# Patient Record
Sex: MALE | Race: WHITE | NOT HISPANIC OR LATINO | Employment: FULL TIME | URBAN - METROPOLITAN AREA
[De-identification: names, ages, dates, MRNs, and addresses within clinical notes are randomized per-mention and may not be internally consistent; named-entity substitution may affect disease eponyms.]

---

## 2017-01-17 ENCOUNTER — TELEPHONE (OUTPATIENT)
Dept: CARDIOLOGY | Facility: HOSPITAL | Age: 62
End: 2017-01-17

## 2017-01-17 NOTE — TELEPHONE ENCOUNTER
Dr. Mosley,   I had an cardiac rehab interview with Mr. Pfeiffer today and a couple of questions came up. One he is still on Amiodarone 100mg 2tabs BID. He was wondering when he would start weaning off this medication. He has had no further episodes of A-fib. He is also on aspirin 325mg daily . He was wondering if he needed to decrease to asa 81mg?    Lastly, he was wanting to start lower body light weight training. Are you ok with that?  Thanks,   Magalie

## 2017-01-17 NOTE — TELEPHONE ENCOUNTER
Decrease to aspirin 81 mg a day.  Decrease amiodarone down to 200 mg daily for 2 weeks then discontinue altogether.

## 2017-01-17 NOTE — TELEPHONE ENCOUNTER
Dimitri,   I called Mr. Pfeiffer to let him know to decrease his ASA 81mg daily and to decrease amiodarone 200mg daily for 2 weeks and then discontinue.  Magalie

## 2017-01-19 ENCOUNTER — TELEPHONE (OUTPATIENT)
Dept: CARDIOLOGY | Facility: HOSPITAL | Age: 62
End: 2017-01-19

## 2017-01-19 ENCOUNTER — APPOINTMENT (OUTPATIENT)
Dept: CARDIOLOGY | Facility: HOSPITAL | Age: 62
End: 2017-01-19

## 2017-01-19 ENCOUNTER — HOSPITAL ENCOUNTER (OUTPATIENT)
Dept: CARDIOLOGY | Facility: HOSPITAL | Age: 62
Setting detail: THERAPIES SERIES
Discharge: HOME | End: 2017-01-19

## 2017-01-19 ENCOUNTER — HOSPITAL ENCOUNTER (OUTPATIENT)
Dept: CARDIOLOGY | Facility: HOSPITAL | Age: 62
End: 2017-01-19

## 2017-01-19 PROCEDURE — 93798 PHYS/QHP OP CAR RHAB W/ECG: CPT | Performed by: INTERNAL MEDICINE

## 2017-01-19 NOTE — TELEPHONE ENCOUNTER
Dr. Mosley,  Can he start light lower body weight training on his off days of rehab.   Thanks,Magalie

## 2017-01-20 ENCOUNTER — HOSPITAL ENCOUNTER (OUTPATIENT)
Dept: CARDIOLOGY | Facility: HOSPITAL | Age: 62
Setting detail: THERAPIES SERIES
Discharge: HOME | End: 2017-01-20

## 2017-01-20 PROCEDURE — 93798 PHYS/QHP OP CAR RHAB W/ECG: CPT | Performed by: INTERNAL MEDICINE

## 2017-01-23 ENCOUNTER — HOSPITAL ENCOUNTER (OUTPATIENT)
Dept: CARDIOLOGY | Facility: HOSPITAL | Age: 62
Setting detail: THERAPIES SERIES
Discharge: HOME | End: 2017-01-23

## 2017-01-23 PROCEDURE — 93798 PHYS/QHP OP CAR RHAB W/ECG: CPT | Performed by: INTERNAL MEDICINE

## 2017-01-25 ENCOUNTER — HOSPITAL ENCOUNTER (OUTPATIENT)
Dept: CARDIOLOGY | Facility: HOSPITAL | Age: 62
Setting detail: THERAPIES SERIES
Discharge: HOME | End: 2017-01-25

## 2017-01-25 PROCEDURE — 93798 PHYS/QHP OP CAR RHAB W/ECG: CPT | Performed by: INTERNAL MEDICINE

## 2017-01-27 ENCOUNTER — HOSPITAL ENCOUNTER (OUTPATIENT)
Dept: CARDIOLOGY | Facility: HOSPITAL | Age: 62
Setting detail: THERAPIES SERIES
Discharge: HOME | End: 2017-01-27

## 2017-01-27 PROCEDURE — 93798 PHYS/QHP OP CAR RHAB W/ECG: CPT | Performed by: INTERNAL MEDICINE

## 2017-01-30 ENCOUNTER — HOSPITAL ENCOUNTER (OUTPATIENT)
Dept: CARDIOLOGY | Facility: HOSPITAL | Age: 62
Setting detail: THERAPIES SERIES
Discharge: HOME | End: 2017-01-30

## 2017-01-30 PROCEDURE — 93798 PHYS/QHP OP CAR RHAB W/ECG: CPT | Performed by: INTERNAL MEDICINE

## 2017-02-01 ENCOUNTER — APPOINTMENT (OUTPATIENT)
Dept: CARDIOLOGY | Facility: HOSPITAL | Age: 62
End: 2017-02-01

## 2017-02-03 ENCOUNTER — HOSPITAL ENCOUNTER (OUTPATIENT)
Dept: CARDIOLOGY | Facility: HOSPITAL | Age: 62
Setting detail: THERAPIES SERIES
Discharge: HOME | End: 2017-02-03

## 2017-02-03 PROCEDURE — 93798 PHYS/QHP OP CAR RHAB W/ECG: CPT

## 2017-02-06 ENCOUNTER — HOSPITAL ENCOUNTER (OUTPATIENT)
Dept: CARDIOLOGY | Facility: HOSPITAL | Age: 62
Setting detail: THERAPIES SERIES
Discharge: HOME | End: 2017-02-06

## 2017-02-06 PROCEDURE — 93798 PHYS/QHP OP CAR RHAB W/ECG: CPT

## 2017-02-08 ENCOUNTER — HOSPITAL ENCOUNTER (OUTPATIENT)
Dept: CARDIOLOGY | Facility: HOSPITAL | Age: 62
Setting detail: THERAPIES SERIES
Discharge: HOME | End: 2017-02-08

## 2017-02-08 PROCEDURE — 93798 PHYS/QHP OP CAR RHAB W/ECG: CPT

## 2017-02-10 ENCOUNTER — HOSPITAL ENCOUNTER (OUTPATIENT)
Dept: CARDIOLOGY | Facility: HOSPITAL | Age: 62
Setting detail: THERAPIES SERIES
Discharge: HOME | End: 2017-02-10

## 2017-02-10 PROCEDURE — 93798 PHYS/QHP OP CAR RHAB W/ECG: CPT

## 2017-02-13 ENCOUNTER — HOSPITAL ENCOUNTER (OUTPATIENT)
Dept: CARDIOLOGY | Facility: HOSPITAL | Age: 62
Setting detail: THERAPIES SERIES
Discharge: HOME | End: 2017-02-13

## 2017-02-13 PROCEDURE — 93798 PHYS/QHP OP CAR RHAB W/ECG: CPT

## 2017-02-15 ENCOUNTER — HOSPITAL ENCOUNTER (OUTPATIENT)
Dept: CARDIOLOGY | Facility: HOSPITAL | Age: 62
Setting detail: THERAPIES SERIES
Discharge: HOME | End: 2017-02-15

## 2017-02-15 PROCEDURE — 93798 PHYS/QHP OP CAR RHAB W/ECG: CPT

## 2017-02-17 ENCOUNTER — HOSPITAL ENCOUNTER (OUTPATIENT)
Dept: CARDIOLOGY | Facility: HOSPITAL | Age: 62
Setting detail: THERAPIES SERIES
Discharge: HOME | End: 2017-02-17

## 2017-02-17 PROCEDURE — 93798 PHYS/QHP OP CAR RHAB W/ECG: CPT

## 2017-02-20 ENCOUNTER — HOSPITAL ENCOUNTER (OUTPATIENT)
Dept: CARDIOLOGY | Facility: HOSPITAL | Age: 62
Setting detail: THERAPIES SERIES
Discharge: HOME | End: 2017-02-20

## 2017-02-20 PROCEDURE — 93798 PHYS/QHP OP CAR RHAB W/ECG: CPT

## 2017-02-22 ENCOUNTER — HOSPITAL ENCOUNTER (OUTPATIENT)
Dept: CARDIOLOGY | Facility: HOSPITAL | Age: 62
Setting detail: THERAPIES SERIES
Discharge: HOME | End: 2017-02-22

## 2017-02-22 PROCEDURE — 93798 PHYS/QHP OP CAR RHAB W/ECG: CPT

## 2017-02-24 ENCOUNTER — HOSPITAL ENCOUNTER (OUTPATIENT)
Dept: CARDIOLOGY | Facility: HOSPITAL | Age: 62
Setting detail: THERAPIES SERIES
Discharge: HOME | End: 2017-02-24

## 2017-02-24 PROCEDURE — 93798 PHYS/QHP OP CAR RHAB W/ECG: CPT

## 2017-02-27 ENCOUNTER — HOSPITAL ENCOUNTER (OUTPATIENT)
Dept: CARDIOLOGY | Facility: HOSPITAL | Age: 62
Setting detail: THERAPIES SERIES
Discharge: HOME | End: 2017-02-27

## 2017-02-27 PROCEDURE — 93798 PHYS/QHP OP CAR RHAB W/ECG: CPT

## 2017-03-01 ENCOUNTER — HOSPITAL ENCOUNTER (OUTPATIENT)
Dept: CARDIOLOGY | Facility: HOSPITAL | Age: 62
Setting detail: THERAPIES SERIES
Discharge: HOME | End: 2017-03-01

## 2017-03-01 PROCEDURE — 93798 PHYS/QHP OP CAR RHAB W/ECG: CPT

## 2017-03-03 ENCOUNTER — HOSPITAL ENCOUNTER (OUTPATIENT)
Dept: CARDIOLOGY | Facility: HOSPITAL | Age: 62
Setting detail: THERAPIES SERIES
Discharge: HOME | End: 2017-03-03

## 2017-03-03 ENCOUNTER — TELEPHONE (OUTPATIENT)
Dept: CARDIOLOGY | Facility: HOSPITAL | Age: 62
End: 2017-03-03

## 2017-03-03 PROCEDURE — 93798 PHYS/QHP OP CAR RHAB W/ECG: CPT

## 2017-03-03 NOTE — TELEPHONE ENCOUNTER
Pt came in today for cardiac rehab. He would like to know if there are any physical therapy-type exercises for his sternum that you would recommend? I suggested he contact his surgeon, but his surgeon in in [place] and he preferred that I ask you. Thank you, Darlene WALSH RN

## 2017-03-06 ENCOUNTER — HOSPITAL ENCOUNTER (OUTPATIENT)
Dept: CARDIOLOGY | Facility: HOSPITAL | Age: 62
Setting detail: THERAPIES SERIES
Discharge: HOME | End: 2017-03-06

## 2017-03-06 PROCEDURE — 93798 PHYS/QHP OP CAR RHAB W/ECG: CPT

## 2017-03-08 ENCOUNTER — HOSPITAL ENCOUNTER (OUTPATIENT)
Dept: CARDIOLOGY | Facility: HOSPITAL | Age: 62
Setting detail: THERAPIES SERIES
Discharge: HOME | End: 2017-03-08

## 2017-03-08 PROCEDURE — 93798 PHYS/QHP OP CAR RHAB W/ECG: CPT

## 2017-03-10 ENCOUNTER — HOSPITAL ENCOUNTER (OUTPATIENT)
Dept: CARDIOLOGY | Facility: HOSPITAL | Age: 62
Setting detail: THERAPIES SERIES
Discharge: HOME | End: 2017-03-10

## 2017-03-10 PROCEDURE — 93798 PHYS/QHP OP CAR RHAB W/ECG: CPT

## 2017-03-13 ENCOUNTER — HOSPITAL ENCOUNTER (OUTPATIENT)
Dept: CARDIOLOGY | Facility: HOSPITAL | Age: 62
Setting detail: THERAPIES SERIES
Discharge: HOME | End: 2017-03-13

## 2017-03-13 PROCEDURE — 93798 PHYS/QHP OP CAR RHAB W/ECG: CPT

## 2017-03-15 ENCOUNTER — HOSPITAL ENCOUNTER (OUTPATIENT)
Dept: CARDIOLOGY | Facility: HOSPITAL | Age: 62
Setting detail: THERAPIES SERIES
Discharge: HOME | End: 2017-03-15

## 2017-03-15 PROCEDURE — 93798 PHYS/QHP OP CAR RHAB W/ECG: CPT

## 2017-03-17 ENCOUNTER — HOSPITAL ENCOUNTER (OUTPATIENT)
Dept: CARDIOLOGY | Facility: HOSPITAL | Age: 62
Setting detail: THERAPIES SERIES
Discharge: HOME | End: 2017-03-17

## 2017-03-17 PROCEDURE — 93798 PHYS/QHP OP CAR RHAB W/ECG: CPT

## 2017-03-20 ENCOUNTER — HOSPITAL ENCOUNTER (OUTPATIENT)
Dept: CARDIOLOGY | Facility: HOSPITAL | Age: 62
Setting detail: THERAPIES SERIES
Discharge: HOME | End: 2017-03-20

## 2017-03-20 PROCEDURE — 93798 PHYS/QHP OP CAR RHAB W/ECG: CPT

## 2017-03-22 ENCOUNTER — HOSPITAL ENCOUNTER (OUTPATIENT)
Dept: CARDIOLOGY | Facility: HOSPITAL | Age: 62
Setting detail: THERAPIES SERIES
Discharge: HOME | End: 2017-03-22

## 2017-03-22 PROCEDURE — 93798 PHYS/QHP OP CAR RHAB W/ECG: CPT

## 2017-03-24 ENCOUNTER — HOSPITAL ENCOUNTER (OUTPATIENT)
Dept: CARDIOLOGY | Facility: HOSPITAL | Age: 62
Setting detail: THERAPIES SERIES
Discharge: HOME | End: 2017-03-24

## 2017-03-24 PROCEDURE — 93798 PHYS/QHP OP CAR RHAB W/ECG: CPT

## 2017-03-27 ENCOUNTER — HOSPITAL ENCOUNTER (OUTPATIENT)
Dept: CARDIOLOGY | Facility: HOSPITAL | Age: 62
Setting detail: THERAPIES SERIES
Discharge: HOME | End: 2017-03-27

## 2017-03-27 PROCEDURE — 93798 PHYS/QHP OP CAR RHAB W/ECG: CPT

## 2017-03-29 ENCOUNTER — HOSPITAL ENCOUNTER (OUTPATIENT)
Dept: CARDIOLOGY | Facility: HOSPITAL | Age: 62
Setting detail: THERAPIES SERIES
Discharge: HOME | End: 2017-03-29

## 2017-03-29 PROCEDURE — 93798 PHYS/QHP OP CAR RHAB W/ECG: CPT

## 2017-06-13 ENCOUNTER — OFFICE VISIT (OUTPATIENT)
Dept: CARDIOLOGY | Facility: CLINIC | Age: 62
End: 2017-06-13

## 2017-06-13 VITALS
DIASTOLIC BLOOD PRESSURE: 88 MMHG | SYSTOLIC BLOOD PRESSURE: 142 MMHG | HEART RATE: 68 BPM | HEIGHT: 68 IN | RESPIRATION RATE: 18 BRPM | BODY MASS INDEX: 31.22 KG/M2 | WEIGHT: 206 LBS

## 2017-06-13 DIAGNOSIS — I25.810 CORONARY ARTERY DISEASE INVOLVING CORONARY BYPASS GRAFT OF NATIVE HEART WITHOUT ANGINA PECTORIS: Primary | ICD-10-CM

## 2017-06-13 PROCEDURE — 93000 ELECTROCARDIOGRAM COMPLETE: CPT | Performed by: INTERNAL MEDICINE

## 2017-06-13 PROCEDURE — 99213 OFFICE O/P EST LOW 20 MIN: CPT | Performed by: INTERNAL MEDICINE

## 2017-06-21 NOTE — PROGRESS NOTES
Subjective:     Encounter Date:06/13/2017      Patient ID: Janusz Pfeiffer is a 62 y.o. male.    Chief Complaint:  Coronary Artery Disease   Presents for follow-up visit. Pertinent negatives include no chest pain, chest pressure, chest tightness, leg swelling, palpitations or shortness of breath. His past medical history is significant for past myocardial infarction. The symptoms have been stable. Compliance with diet is good. Compliance with exercise is good. Compliance with medications is good.       62-year-old [place] status post coronary artery bypass grafting 6 months ago.  He presents today for reevaluation doing very well.    Review of Systems   Cardiovascular: Negative for chest pain, leg swelling and palpitations.   Respiratory: Negative for chest tightness and shortness of breath.    All other systems reviewed and are negative.        ECG 12 Lead  Date/Time: 6/13/2017 2:08 PM  Performed by: YASMINE MALIK  Authorized by: YASMINE MALIK   Comparison: compared with previous ECG from 12/19/2016  Similar to previous ECG  Rhythm: sinus rhythm  Other findings: PRWP  Clinical impression: abnormal ECG               Objective:     Physical Exam   Constitutional: He is oriented to person, place, and time. He appears well-developed.   HENT:   Head: Normocephalic.   Eyes: Conjunctivae are normal.   Neck: Normal range of motion.   Cardiovascular: Normal rate, regular rhythm and normal heart sounds.    Pulmonary/Chest: Breath sounds normal.   Abdominal: Soft. Bowel sounds are normal.   Musculoskeletal: Normal range of motion. He exhibits no edema.   Neurological: He is alert and oriented to person, place, and time.   Skin: Skin is warm and dry.   Psychiatric: He has a normal mood and affect. His behavior is normal.   Vitals reviewed.      Lab Review:       Assessment:         No diagnosis found.       Plan:       1.  Multivessel disease status post coronary bypass grafting doing well no issues.  2.  Blood  pressures borderline high total continue to follow it  3.  Follow-up in one year sooner if issues      Coronary Artery Disease  Assessment  • The patient has no angina    Plan  • Lifestyle modifications discussed include adhering to a heart healthy diet, avoidance of tobacco products, maintenance of a healthy weight, medication compliance, regular exercise and regular monitoring of cholesterol and blood pressure    Subjective - Objective  • There is a history of past MI  • There is a history of previous coronary artery bypass graft  • Current antiplatelet therapy includes aspirin 81 mg

## 2018-06-18 ENCOUNTER — OFFICE VISIT (OUTPATIENT)
Dept: CARDIOLOGY | Facility: CLINIC | Age: 63
End: 2018-06-18

## 2018-06-18 VITALS
DIASTOLIC BLOOD PRESSURE: 90 MMHG | BODY MASS INDEX: 33.99 KG/M2 | HEART RATE: 62 BPM | WEIGHT: 204 LBS | SYSTOLIC BLOOD PRESSURE: 150 MMHG | HEIGHT: 65 IN

## 2018-06-18 DIAGNOSIS — E78.5 HYPERLIPIDEMIA, UNSPECIFIED HYPERLIPIDEMIA TYPE: ICD-10-CM

## 2018-06-18 DIAGNOSIS — I10 HYPERTENSION, UNSPECIFIED TYPE: ICD-10-CM

## 2018-06-18 DIAGNOSIS — Z95.1 S/P CABG X 4: ICD-10-CM

## 2018-06-18 DIAGNOSIS — I25.810 CORONARY ARTERY DISEASE INVOLVING CORONARY BYPASS GRAFT OF NATIVE HEART WITHOUT ANGINA PECTORIS: Primary | ICD-10-CM

## 2018-06-18 DIAGNOSIS — Z95.5 S/P CORONARY ARTERY STENT PLACEMENT: ICD-10-CM

## 2018-06-18 PROCEDURE — 93000 ELECTROCARDIOGRAM COMPLETE: CPT | Performed by: NURSE PRACTITIONER

## 2018-06-18 PROCEDURE — 99214 OFFICE O/P EST MOD 30 MIN: CPT | Performed by: NURSE PRACTITIONER

## 2018-06-18 RX ORDER — UBIDECARENONE 100 MG
100 CAPSULE ORAL DAILY
COMMUNITY
End: 2021-01-19 | Stop reason: HOSPADM

## 2018-06-18 RX ORDER — TADALAFIL 5 MG/1
5 TABLET ORAL DAILY
COMMUNITY
End: 2020-01-14 | Stop reason: ALTCHOICE

## 2018-06-18 RX ORDER — TESTOSTERONE GEL, 1% 10 MG/G
GEL TRANSDERMAL
COMMUNITY
End: 2020-01-14 | Stop reason: ALTCHOICE

## 2019-01-04 ENCOUNTER — OFFICE VISIT (OUTPATIENT)
Dept: CARDIOLOGY | Facility: CLINIC | Age: 64
End: 2019-01-04

## 2019-01-04 VITALS
OXYGEN SATURATION: 99 % | HEIGHT: 68 IN | WEIGHT: 212.8 LBS | SYSTOLIC BLOOD PRESSURE: 126 MMHG | DIASTOLIC BLOOD PRESSURE: 84 MMHG | HEART RATE: 63 BPM | BODY MASS INDEX: 32.25 KG/M2

## 2019-01-04 DIAGNOSIS — I10 ESSENTIAL HYPERTENSION: ICD-10-CM

## 2019-01-04 DIAGNOSIS — I25.10 CORONARY ARTERIOSCLEROSIS: Primary | ICD-10-CM

## 2019-01-04 PROBLEM — E78.5 DYSLIPIDEMIA: Status: ACTIVE | Noted: 2018-08-21

## 2019-01-04 PROCEDURE — 99213 OFFICE O/P EST LOW 20 MIN: CPT | Performed by: NURSE PRACTITIONER

## 2019-01-04 NOTE — PROGRESS NOTES
Patient Name: Janusz Pfeiffer  :1955  Age: 63 y.o.  Primary Cardiologist: Phan Mosley MD  Encounter Provider:  ZULEIKA Bucio      Chief Complaint:   Chief Complaint   Patient presents with   • Coronary Artery Disease     6 mos follow up         HPI  Janusz Pfeiffer is a 63 y.o. male with a history significant for coronary artery disease with prior LAD stent placement in , CABG in 2016, hypertension, hyperlipidemia.  Patient presents today for annual follow-up.  Patient is new to me but I have reviewed the prior medical records.  Patient states that he has done well over the past 6 months.  He reports that he does note an occasional PVC.  He reports that he exercises daily and states that he frequently gets at least 10,000 steps on his apple watch.  He states that he is not having any chest pain, shortness of breath, palpitations, lightheadedness, fatigue.  Reports occasional pedal edema which improves with compression hose.  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  He is questioning if this is cleared by cardiology or not.    The following portions of the patient's history were reviewed and updated as appropriate: allergies, current medications, past family history, past medical history, past social history, past surgical history and problem list.    Current Outpatient Medications on File Prior to Visit   Medication Sig   • aspirin 81 MG tablet Take 81 mg by mouth Daily.   • atorvastatin (LIPITOR) 40 MG tablet Take 40 mg by mouth Daily.   • Cholecalciferol (VITAMIN D) 1000 units tablet Take 1,000 Units by mouth Daily.   • coenzyme Q10 100 MG capsule Take 100 mg by mouth Daily.   • lisinopril (PRINIVIL,ZESTRIL) 10 MG tablet Take 10 mg by mouth Daily.   • metoprolol tartrate (LOPRESSOR) 25 MG tablet Take 25  "mg by mouth 2 (Two) Times a Day.   • tadalafil (CIALIS) 5 MG tablet Take 5 mg by mouth Daily.   • testosterone (ANDROGEL) 50 MG/5GM (1%) gel gel Apply one gram topically twice daily     No current facility-administered medications on file prior to visit.          Review of Systems   Constitution: Positive for weight gain. Negative for malaise/fatigue.   Cardiovascular: Negative for chest pain and leg swelling.   Respiratory: Negative for shortness of breath.    Musculoskeletal: Positive for myalgias.   Genitourinary: Positive for decreased libido.   Neurological: Negative for light-headedness.   All other systems reviewed and are negative.      OBJECTIVE:   Vital Signs  Vitals:    01/04/19 1228   BP: 126/84   Pulse: 63   SpO2: 99%     Estimated body mass index is 32.36 kg/m² as calculated from the following:    Height as of this encounter: 172.7 cm (68\").    Weight as of this encounter: 96.5 kg (212 lb 12.8 oz).    Physical Exam   Constitutional: He is oriented to person, place, and time. Vital signs are normal. He appears well-developed and well-nourished.   Eyes: Conjunctivae are normal.   Neck: Carotid bruit is not present.   Cardiovascular: Normal rate, regular rhythm and normal heart sounds.   No murmur heard.  Pulmonary/Chest: Effort normal and breath sounds normal.   Abdominal: Normal appearance.   Musculoskeletal: Normal range of motion.   No pedal edema   Neurological: He is alert and oriented to person, place, and time. GCS eye subscore is 4. GCS verbal subscore is 5. GCS motor subscore is 6.   Skin: Skin is warm and dry.   Psychiatric: He has a normal mood and affect. His speech is normal and behavior is normal. Judgment and thought content normal. Cognition and memory are normal.       Procedures    Cardiac Procedures:    1. Treadmill stress test 12/22/16: Normal treadmill stress test.        ASSESSMENT:      Diagnosis Plan   1. Coronary arteriosclerosis     2. Essential hypertension           PLAN OF " CARE:   1. Coronary artery disease: Patient states that he has not had any episodes of shortness of breath or chest pain.  Reports that he exercises daily and complete at least 10,000 steps a day.  States that overall he feels very well.  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  I will defer this to Dr. Mosley.  Patient's last treadmill stress test December 2016 which was normal.  2. Hypertension: Patient checks his blood pressure routinely.  It is managed in the office today at 126/84.  At home it averages 120s-130s/70s.  Continue with current medication regimen.  3. Follow-up with Dr. Mosley in one year or sooner with any problems.      Coronary Artery Disease  Subjective - Objective  • There is a history of previous coronary artery bypass graft 12/2016  • There has been a previous stent procedure using ARPITA 2000  • Current antiplatelet therapy includes aspirin 81 mg          Thank you for allowing me to participate in the care of your patient,      Sincerely,   ZULEIKA Bucio  [facility]  01/04/19  12:37 PM    **Jules Disclaimer:**  Much of this encounter note is an electronic transcription/translation of spoken language to printed text. The electronic translation of spoken language may permit erroneous, or at times, nonsensical words or phrases to be inadvertently transcribed. Although I have reviewed the note for such errors, some may still exist.

## 2019-01-15 ENCOUNTER — PREP FOR SURGERY (OUTPATIENT)
Dept: OTHER | Facility: HOSPITAL | Age: 64
End: 2019-01-15

## 2019-01-15 DIAGNOSIS — Z12.11 SCREEN FOR COLON CANCER: Primary | ICD-10-CM

## 2019-01-21 PROBLEM — Z12.11 SCREEN FOR COLON CANCER: Status: ACTIVE | Noted: 2019-01-21

## 2019-03-04 ENCOUNTER — TRANSCRIBE ORDERS (OUTPATIENT)
Dept: ADMINISTRATIVE | Facility: HOSPITAL | Age: 64
End: 2019-03-04

## 2019-03-04 DIAGNOSIS — Z13.9 SCREENING PROCEDURE: Primary | ICD-10-CM

## 2019-03-14 ENCOUNTER — HOSPITAL ENCOUNTER (OUTPATIENT)
Dept: CARDIOLOGY | Facility: HOSPITAL | Age: 64
Discharge: HOME | End: 2019-03-14

## 2019-03-14 VITALS
HEIGHT: 68 IN | BODY MASS INDEX: 31.22 KG/M2 | SYSTOLIC BLOOD PRESSURE: 116 MMHG | WEIGHT: 206 LBS | HEART RATE: 53 BPM | DIASTOLIC BLOOD PRESSURE: 70 MMHG

## 2019-03-14 DIAGNOSIS — Z13.9 SCREENING PROCEDURE: ICD-10-CM

## 2019-03-14 LAB
BH CV ECHO MEAS - DIST AO DIAM: 1.48 CM
BH CV VAS BP LEFT ARM: NORMAL MMHG
BH CV VAS BP RIGHT ARM: NORMAL MMHG
BH CV XLRA MEAS - MID AO DIAM: 1.61 CM
BH CV XLRA MEAS - PAD LEFT ABI DP: 1.36
BH CV XLRA MEAS - PAD LEFT ABI PT: 1.29
BH CV XLRA MEAS - PAD LEFT ARM: 116 MMHG
BH CV XLRA MEAS - PAD LEFT LEG DP: 158 MMHG
BH CV XLRA MEAS - PAD LEFT LEG PT: 150 MMHG
BH CV XLRA MEAS - PAD RIGHT ABI DP: 1.29
BH CV XLRA MEAS - PAD RIGHT ABI PT: 1.36
BH CV XLRA MEAS - PAD RIGHT ARM: 114 MMHG
BH CV XLRA MEAS - PAD RIGHT LEG DP: 150 MMHG
BH CV XLRA MEAS - PAD RIGHT LEG PT: 158 MMHG
BH CV XLRA MEAS - PROX AO DIAM: 2.09 CM
BH CV XLRA MEAS LEFT ICA/CCA RATIO: 1.05
BH CV XLRA MEAS LEFT MID CCA PSV: NORMAL CM/SEC
BH CV XLRA MEAS LEFT MID ICA PSV: NORMAL CM/SEC
BH CV XLRA MEAS LEFT PROX ECA PSV: NORMAL CM/SEC
BH CV XLRA MEAS RIGHT ICA/CCA RATIO: 1.12
BH CV XLRA MEAS RIGHT MID CCA PSV: NORMAL CM/SEC
BH CV XLRA MEAS RIGHT MID ICA PSV: NORMAL CM/SEC
BH CV XLRA MEAS RIGHT PROX ECA PSV: NORMAL CM/SEC

## 2019-03-14 PROCEDURE — 93799 UNLISTED CV SVC/PROCEDURE: CPT

## 2019-03-14 PROCEDURE — 71046 X-RAY EXAM CHEST 2 VIEWS: CPT | Performed by: RADIOLOGY

## 2019-03-21 ENCOUNTER — ANESTHESIA (OUTPATIENT)
Dept: GASTROENTEROLOGY | Facility: HOSPITAL | Age: 64
End: 2019-03-21

## 2019-03-21 ENCOUNTER — HOSPITAL ENCOUNTER (OUTPATIENT)
Facility: HOSPITAL | Age: 64
Setting detail: HOSPITAL OUTPATIENT SURGERY
Discharge: HOME | End: 2019-03-21
Attending: SURGERY | Admitting: SURGERY

## 2019-03-21 ENCOUNTER — ANESTHESIA EVENT (OUTPATIENT)
Dept: GASTROENTEROLOGY | Facility: HOSPITAL | Age: 64
End: 2019-03-21

## 2019-03-21 VITALS
SYSTOLIC BLOOD PRESSURE: 160 MMHG | DIASTOLIC BLOOD PRESSURE: 90 MMHG | WEIGHT: 205.3 LBS | OXYGEN SATURATION: 97 % | HEIGHT: 68 IN | HEART RATE: 54 BPM | RESPIRATION RATE: 16 BRPM | BODY MASS INDEX: 31.11 KG/M2 | TEMPERATURE: 98.3 F

## 2019-03-21 PROCEDURE — 45378 DIAGNOSTIC COLONOSCOPY: CPT | Performed by: SURGERY

## 2019-03-21 PROCEDURE — 25010000002 PROPOFOL 10 MG/ML EMULSION: Performed by: ANESTHESIOLOGY

## 2019-03-21 RX ORDER — LIDOCAINE HYDROCHLORIDE 20 MG/ML
INJECTION, SOLUTION INFILTRATION; PERINEURAL AS NEEDED
Status: DISCONTINUED | OUTPATIENT
Start: 2019-03-21 | End: 2019-03-21 | Stop reason: SURG

## 2019-03-21 RX ORDER — SODIUM CHLORIDE 0.9 % (FLUSH) 0.9 %
3 SYRINGE (ML) INJECTION AS NEEDED
Status: DISCONTINUED | OUTPATIENT
Start: 2019-03-21 | End: 2019-03-21 | Stop reason: HOSPADM

## 2019-03-21 RX ORDER — PROPOFOL 10 MG/ML
VIAL (ML) INTRAVENOUS CONTINUOUS PRN
Status: DISCONTINUED | OUTPATIENT
Start: 2019-03-21 | End: 2019-03-21 | Stop reason: SURG

## 2019-03-21 RX ORDER — LIDOCAINE HYDROCHLORIDE 10 MG/ML
0.5 INJECTION, SOLUTION INFILTRATION; PERINEURAL ONCE AS NEEDED
Status: DISCONTINUED | OUTPATIENT
Start: 2019-03-21 | End: 2019-03-21 | Stop reason: HOSPADM

## 2019-03-21 RX ORDER — SODIUM CHLORIDE, SODIUM LACTATE, POTASSIUM CHLORIDE, CALCIUM CHLORIDE 600; 310; 30; 20 MG/100ML; MG/100ML; MG/100ML; MG/100ML
1000 INJECTION, SOLUTION INTRAVENOUS CONTINUOUS
Status: DISCONTINUED | OUTPATIENT
Start: 2019-03-21 | End: 2019-03-21 | Stop reason: HOSPADM

## 2019-03-21 RX ADMIN — SODIUM CHLORIDE, POTASSIUM CHLORIDE, SODIUM LACTATE AND CALCIUM CHLORIDE 1000 ML: 600; 310; 30; 20 INJECTION, SOLUTION INTRAVENOUS at 11:29

## 2019-03-21 RX ADMIN — LIDOCAINE HYDROCHLORIDE 60 MG: 20 INJECTION, SOLUTION INFILTRATION; PERINEURAL at 12:35

## 2019-03-21 RX ADMIN — PROPOFOL 200 MCG/KG/MIN: 10 INJECTION, EMULSION INTRAVENOUS at 12:35

## 2019-03-21 NOTE — H&P
HPI: Screening, no family history of colon cancer, no personal history of polyps    PMH, PSH, MEDS AND ALLERGIES reviewed and reconciled with EPIC    PHYSICAL EXAM:  -  Constitutional:  no acute distress  -  Respiratory:  normal inspiratory effort  -  Cardiovascular: regular rate  -  Gastrointestinal: Soft    ASSESSMENT/PLAN:    Colonoscopy    Carlos Storey M.D.

## 2019-03-21 NOTE — ANESTHESIA PREPROCEDURE EVALUATION
Anesthesia Evaluation     Patient summary reviewed and Nursing notes reviewed                Airway   Mallampati: I  TM distance: >3 FB  Neck ROM: full  No difficulty expected  Dental - normal exam     Pulmonary - negative pulmonary ROS and normal exam   Cardiovascular - normal exam    (+) hypertension, past MI , CAD, CABG,       Neuro/Psych- negative ROS  GI/Hepatic/Renal/Endo - negative ROS     Musculoskeletal (-) negative ROS    Abdominal  - normal exam    Bowel sounds: normal.   Substance History - negative use     OB/GYN negative ob/gyn ROS         Other      history of cancer                    Anesthesia Plan    ASA 3     MAC     Anesthetic plan, all risks, benefits, and alternatives have been provided, discussed and informed consent has been obtained with: patient.

## 2019-03-21 NOTE — DISCHARGE INSTRUCTIONS
Dr. Storey    [id number]    Colonoscopy, Adult, Care After    This sheet gives you information about how to care for yourself after your procedure. Your health care provider may also give you more specific instructions. If you have problems or questions, contact your health care provider.  What can I expect after the procedure?  After the procedure, it is common to have:  · A small amount of blood in your stool for 24 hours after the procedure.  · Some gas.  · Mild abdominal cramping or bloating.    Follow these instructions at home:  General instructions    · For the first 24 hours after the procedure:  ? Do not drive or use machinery.  ? Do not sign important documents.  ? Do not drink alcohol.  ? Do your regular daily activities at a slower pace than normal.  ? Eat soft, easy-to-digest foods.  ? Rest often.  · Take over-the-counter or prescription medicines only as told by your health care provider.  · It is up to you to get the results of your procedure. Ask your health care provider, or the department performing the procedure, when your results will be ready.  Relieving cramping and bloating  · Try walking around when you have cramps or feel bloated.  · Apply heat to your abdomen as told by your health care provider. Use a heat source that your health care provider recommends, such as a moist heat pack or a heating pad.  ? Place a towel between your skin and the heat source.  ? Leave the heat on for 20-30 minutes.  ? Remove the heat if your skin turns bright red. This is especially important if you are unable to feel pain, heat, or cold. You may have a greater risk of getting burned.  Eating and drinking  · Drink enough fluid to keep your urine clear or pale yellow.  · Resume your normal diet as instructed by your health care provider. Avoid heavy or fried foods that are hard to digest.  · Avoid drinking alcohol for as long as instructed by your health care provider.  Contact a health care provider if:  · You  have blood in your stool 2-3 days after the procedure.  Get help right away if:  · You have more than a small spotting of blood in your stool.  · You pass large blood clots in your stool.  · Your abdomen is swollen.  · You have nausea or vomiting.  · You have a fever.  · You have increasing abdominal pain that is not relieved with medicine.  This information is not intended to replace advice given to you by your health care provider. Make sure you discuss any questions you have with your health care provider.  Document Released: 08/01/2005 Document Revised: 09/11/2017 Document Reviewed: 02/28/2017  [employer] Interactive Patient Education © 2018 [employer]

## 2019-03-21 NOTE — ANESTHESIA POSTPROCEDURE EVALUATION
"Patient: Janusz Pfeiffer    Procedure Summary     Date:  03/21/19 Room / Location:   [facility]    Anesthesia Start:  1238 Anesthesia Stop:  1258    Procedure:  COLONOSCOPY TO CECUM (N/A ) Diagnosis:       Screen for colon cancer      (Screen for colon cancer [Z12.11])    Surgeon:  Carlos Storey MD Provider:  Lai Wilks MD    Anesthesia Type:  MAC ASA Status:  3          Anesthesia Type: MAC  Last vitals  BP   160/90 (03/21/19 1317)   Temp   36.8 °C (98.3 °F) (03/21/19 1120)   Pulse   54 (03/21/19 1317)   Resp   16 (03/21/19 1317)     SpO2   97 % (03/21/19 1317)     Post Anesthesia Care and Evaluation    Patient location during evaluation: PACU  Patient participation: complete - patient participated  Level of consciousness: awake  Pain score: 0  Pain management: adequate  Airway patency: patent  Anesthetic complications: No anesthetic complications  PONV Status: none  Cardiovascular status: acceptable  Respiratory status: acceptable  Hydration status: acceptable    Comments: /90 (BP Location: Left arm, Patient Position: Sitting)   Pulse 54   Temp 36.8 °C (98.3 °F) (Oral)   Resp 16   Ht 172.7 cm (68\")   Wt 93.1 kg (205 lb 4.8 oz)   SpO2 97%   BMI 31.22 kg/m²       "

## 2019-03-21 NOTE — OP NOTE
PREOPERATIVE DIAGNOSIS:  Screening    POSTOPERATIVE DIAGNOSIS AND FINDINGS:  Normal    PROCEDURE:  Colonoscopy to cecum     SURGEON:  Carlos Storey MD    ANESTHESIA:  MAC    SPECIMEN(S):  none    DESCRIPTION:  In decubitus position digital rectal exam was normal. Colonoscope inserted under direct visualization of lumen to cecum confirmed by visualization of ileocecal valve and appendiceal orifice.    Scope slowly withdrawn circumferentially examining all mucosal surfaces.    Quality of bowel preparation good.    There were no mucosal abnormalities.     Tolerated well.    RECOMMENDATION FOR FUTURE SURVEILLANCE:  10 years    Carlos Storey M.D.

## 2020-01-14 ENCOUNTER — OFFICE VISIT (OUTPATIENT)
Dept: CARDIOLOGY | Facility: CLINIC | Age: 65
End: 2020-01-14

## 2020-01-14 VITALS
BODY MASS INDEX: 32.74 KG/M2 | SYSTOLIC BLOOD PRESSURE: 164 MMHG | WEIGHT: 216 LBS | DIASTOLIC BLOOD PRESSURE: 84 MMHG | HEIGHT: 68 IN | HEART RATE: 58 BPM

## 2020-01-14 DIAGNOSIS — Z95.1 S/P CABG X 4: Primary | ICD-10-CM

## 2020-01-14 PROCEDURE — 99213 OFFICE O/P EST LOW 20 MIN: CPT | Performed by: INTERNAL MEDICINE

## 2020-01-14 PROCEDURE — 93000 ELECTROCARDIOGRAM COMPLETE: CPT | Performed by: INTERNAL MEDICINE

## 2020-01-23 NOTE — PROGRESS NOTES
Subjective:     Encounter Date:01/14/2020      Patient ID: Janusz Pfeiffer is a 64 y.o. male.    Chief Complaint:  Coronary Artery Disease   Presents for follow-up visit. Pertinent negatives include no chest pain, chest pressure or chest tightness. The symptoms have been stable. Compliance with diet is good. Compliance with exercise is good. Compliance with medications is good.       64-year-old gentleman presents today for reevaluation.  Patient has a history of coronary bypass grafting dating back several years ago.  He is in the MOLOME study through [facility] and women's [facility].  They are looking for silent atrial fibrillation.  He has been doing well with no issues.    Review of Systems   Cardiovascular: Negative for chest pain.   Respiratory: Negative for chest tightness.    All other systems reviewed and are negative.        ECG 12 Lead  Date/Time: 1/14/2020 2:24 PM  Performed by: Phan Mosley MD  Authorized by: Phan Mosley MD   Comparison: compared with previous ECG from 6/18/2018  Similar to previous ECG  Rhythm: sinus rhythm  Other findings: T wave abnormality    Clinical impression: abnormal EKG               Objective:     Physical Exam   Constitutional: He is oriented to person, place, and time. He appears well-developed.   HENT:   Head: Normocephalic.   Eyes: Conjunctivae are normal.   Neck: Normal range of motion.   Cardiovascular: Normal rate, regular rhythm and normal heart sounds.   Pulmonary/Chest: Breath sounds normal.   Abdominal: Soft. Bowel sounds are normal.   Musculoskeletal: Normal range of motion. He exhibits no edema.   Neurological: He is alert and oriented to person, place, and time.   Skin: Skin is warm and dry.   Psychiatric: He has a normal mood and affect. His behavior is normal.   Vitals reviewed.      Lab Review:       Assessment:          Diagnosis Plan   1. S/P CABG x 4            Plan:       1.  History of coronary artery disease.  Patient is status post  coronary artery bypass grafting in 2016.  He continues to do well he is active we will follow him clinically and probably do a stress test next year which will be at his 5-year anniversary.  2.  Blood pressure is elevated today.  Told him to follow it if remains elevated we need to intervene on it.  3.  Follow-up 1 year sooner if he has issues.

## 2021-01-19 ENCOUNTER — OFFICE VISIT (OUTPATIENT)
Dept: CARDIOLOGY | Facility: CLINIC | Age: 66
End: 2021-01-19

## 2021-01-19 VITALS
SYSTOLIC BLOOD PRESSURE: 130 MMHG | HEART RATE: 55 BPM | DIASTOLIC BLOOD PRESSURE: 80 MMHG | BODY MASS INDEX: 32.01 KG/M2 | HEIGHT: 68 IN | WEIGHT: 211.2 LBS | OXYGEN SATURATION: 98 %

## 2021-01-19 DIAGNOSIS — Z95.1 S/P CABG X 4: ICD-10-CM

## 2021-01-19 DIAGNOSIS — I10 ESSENTIAL HYPERTENSION: Primary | ICD-10-CM

## 2021-01-19 PROCEDURE — 99213 OFFICE O/P EST LOW 20 MIN: CPT | Performed by: INTERNAL MEDICINE

## 2021-01-19 PROCEDURE — 93000 ELECTROCARDIOGRAM COMPLETE: CPT | Performed by: INTERNAL MEDICINE

## 2021-01-21 NOTE — PROGRESS NOTES
"      CARDIOLOGY    Phan Mosley MD    ENCOUNTER DATE:  01/19/2021    Janusz Pfeiffer / 65 y.o. / male        CHIEF COMPLAINT / REASON FOR OFFICE VISIT     Follow-up  Hypertension/coronary artery disease    HISTORY OF PRESENT ILLNESS       HPI  Janusz Pfeiffer is a 65 y.o. male who presents today for evaluation.  Patient history of hypertension as well as coronary artery disease he status post coronary bypass grafting.  He is currently had of MSSA nephews.  He has no issues denies any types of chest pain shortness of breath palpitations lightheadedness swelling or fatigue      The following portions of the patient's history were reviewed and updated as appropriate: allergies, current medications, past family history, past medical history, past social history, past surgical history and problem list.      VITAL SIGNS     Visit Vitals  /80 (BP Location: Left arm)   Pulse 55   Ht 172.7 cm (68\")   Wt 95.8 kg (211 lb 3.2 oz)   SpO2 98%   BMI 32.11 kg/m²         Wt Readings from Last 3 Encounters:   01/19/21 95.8 kg (211 lb 3.2 oz)   01/14/20 98 kg (216 lb)   03/21/19 93.1 kg (205 lb 4.8 oz)     Body mass index is 32.11 kg/m².      REVIEW OF SYSTEMS   Review of Systems   All other systems reviewed and are negative.          PHYSICAL EXAMINATION     Pulmonary:      Effort: Pulmonary effort is normal.      Breath sounds: Normal breath sounds.   Cardiovascular:      PMI at left midclavicular line. Normal rate. Regular rhythm. Normal S1. Normal S2.      Murmurs: There is no murmur.      No gallop. No click. No rub.   Pulses:     Intact distal pulses.   Edema:     Peripheral edema absent.           REVIEWED DATA       ECG 12 Lead    Date/Time: 1/19/2021 11:10 AM  Performed by: Phan Mosley MD  Authorized by: Phan Mosley MD   Comparison: compared with previous ECG from 1/14/2020  Similar to previous ECG  Rhythm: sinus rhythm  Other findings comments: Biphasic T waves in V4 V5 V6    Clinical " impression: abnormal EKG            Cardiac Procedures:  1.           ASSESSMENT & PLAN      Diagnosis Plan   1. Essential hypertension     2. S/P CABG x 4           SUMMARY/DISCUSSION  1. Coronary artery disease status post coronary artery bypass grafting.  He continues to do well his ECG is unchanged.  He did rehab back in 2016.  2. Hypertension blood pressures good  3. Follow-up 1 year sooner if issues        MEDICATIONS         Discharge Medications          Accurate as of January 19, 2021 11:59 PM. If you have any questions, ask your nurse or doctor.            Continue These Medications      Instructions Start Date   aspirin 81 MG tablet   81 mg, Oral, Daily      atorvastatin 40 MG tablet  Commonly known as: LIPITOR   40 mg, Oral, Daily      lisinopril 10 MG tablet  Commonly known as: PRINIVIL,ZESTRIL   10 mg, Oral, Daily      metoprolol tartrate 25 MG tablet  Commonly known as: LOPRESSOR   25 mg, Oral, 2 Times Daily      TADALAFIL PO   6 mg, Oral, Daily      Vitamin D 1000 units tablet   1,000 Units, Oral, Daily         Stop These Medications    coenzyme Q10 100 MG capsule  Stopped by: Phan Mosley MD                **Dragon Disclaimer:   Much of this encounter note is an electronic transcription/translation of spoken language to printed text. The electronic translation of spoken language may permit erroneous, or at times, nonsensical words or phrases to be inadvertently transcribed. Although I have reviewed the note for such errors, some may still exist.

## 2021-04-28 ENCOUNTER — OFFICE VISIT (OUTPATIENT)
Dept: INTERNAL MEDICINE | Facility: CLINIC | Age: 66
End: 2021-04-28

## 2021-04-28 VITALS
SYSTOLIC BLOOD PRESSURE: 164 MMHG | DIASTOLIC BLOOD PRESSURE: 78 MMHG | BODY MASS INDEX: 32.58 KG/M2 | HEART RATE: 57 BPM | HEIGHT: 68 IN | WEIGHT: 215 LBS

## 2021-04-28 DIAGNOSIS — R79.89 LOW TESTOSTERONE IN MALE: ICD-10-CM

## 2021-04-28 DIAGNOSIS — E78.5 DYSLIPIDEMIA: ICD-10-CM

## 2021-04-28 DIAGNOSIS — I10 ESSENTIAL HYPERTENSION: Primary | ICD-10-CM

## 2021-04-28 DIAGNOSIS — G47.00 INSOMNIA, UNSPECIFIED TYPE: ICD-10-CM

## 2021-04-28 PROCEDURE — 99213 OFFICE O/P EST LOW 20 MIN: CPT | Performed by: NURSE PRACTITIONER

## 2021-04-28 RX ORDER — ZOLPIDEM TARTRATE 10 MG/1
TABLET ORAL
COMMUNITY
Start: 2019-02-01 | End: 2022-05-26 | Stop reason: SDUPTHER

## 2021-04-28 RX ORDER — TADALAFIL 5 MG/1
TABLET ORAL
COMMUNITY
Start: 2019-04-01

## 2021-04-28 RX ORDER — LISINOPRIL 20 MG/1
20 TABLET ORAL DAILY
Qty: 90 TABLET | Refills: 1 | Status: SHIPPED | OUTPATIENT
Start: 2021-04-28 | End: 2021-05-06 | Stop reason: SDUPTHER

## 2021-04-28 NOTE — PROGRESS NOTES
Subjective   Janusz Pfeiffer is a 65 y.o. male. Patient is here today for   Chief Complaint   Patient presents with   • Hypertension          Vitals:    04/28/21 1301   BP: 164/78   Pulse: 57     Body mass index is 32.69 kg/m².    The following portions of the patient's history were reviewed and updated as appropriate: allergies, current medications, past family history, past medical history, past social history, past surgical history and problem list.    Past Medical History:   Diagnosis Date   • CAD (coronary artery disease)    • History of transfusion    • Hypocholesteremia    • Ischemic heart disease    • Keratosis    • Myocardial disease (CMS/HCC)    • Myocardial infarction (CMS/HCC)    • PAC (premature atrial contraction)    • PVC (premature ventricular contraction)    • Skin cancer of face       Allergies   Allergen Reactions   • Morphine And Related Hives     soa      Social History     Socioeconomic History   • Marital status:      Spouse name: Not on file   • Number of children: Not on file   • Years of education: Not on file   • Highest education level: Not on file   Tobacco Use   • Smoking status: Never Smoker   • Smokeless tobacco: Never Used   • Tobacco comment: no caffeine    Vaping Use   • Vaping Use: Never used   Substance and Sexual Activity   • Alcohol use: Yes     Comment: social   • Drug use: No   • Sexual activity: Defer        Current Outpatient Medications:   •  aspirin 81 MG tablet, Take 81 mg by mouth Daily., Disp: , Rfl:   •  atorvastatin (LIPITOR) 40 MG tablet, Take 40 mg by mouth Daily., Disp: , Rfl:   •  CBD (cannabidiol) oral oil, , Disp: , Rfl:   •  Cholecalciferol (VITAMIN D) 1000 units tablet, Take 1,000 Units by mouth Daily., Disp: , Rfl:   •  lisinopril (PRINIVIL,ZESTRIL) 20 MG tablet, Take 1 tablet by mouth Daily., Disp: 90 tablet, Rfl: 1  •  metoprolol tartrate (LOPRESSOR) 25 MG tablet, Take 25 mg by mouth 2 (Two) Times a Day., Disp: , Rfl:   •  tadalafil (Cialis) 5 MG  tablet, , Disp: , Rfl:   •  zolpidem (Ambien) 10 MG tablet, , Disp: , Rfl:        Objective   Janusz is a 65 year old new patient who is here to establish care. He is a former patient of Dr Vitale. I do not have previous records to review. He also is followed by Cardiology for CAD s/p CABG. He has HTN and HLD . He has been compliant with his medication and is not experiencing any side effects. He works for the [employer] dept in [facility] and has a work physical scheduled in the next few weeks.     Hypertension  This is a chronic (new to me ) problem. The current episode started more than 1 year ago. The problem has been gradually worsening (reviewed logs and has had some elevated BPs at home ) since onset. Pertinent negatives include no chest pain, headaches, malaise/fatigue, palpitations, peripheral edema or shortness of breath. There are no associated agents to hypertension. Risk factors for coronary artery disease include dyslipidemia and male gender. Past treatments include ACE inhibitors and beta blockers. Current antihypertension treatment includes ACE inhibitors and beta blockers. There are no compliance problems.  Hypertensive end-organ damage includes CAD/MI. There is no history of sleep apnea.                   Review of Systems   Constitutional: Negative for fatigue and malaise/fatigue.   HENT: Negative.    Eyes: Negative for visual disturbance.   Respiratory: Negative for shortness of breath.    Cardiovascular: Negative for chest pain and palpitations.   Gastrointestinal: Negative.    Genitourinary:        H/o low testosterone    Musculoskeletal: Negative.    Neurological: Negative for headaches.   Psychiatric/Behavioral: Positive for sleep disturbance (insomnia , takes ambien as needed ).       Physical Exam  Vitals and nursing note reviewed.   Constitutional:       General: He is not in acute distress.     Appearance: Normal appearance. He is well-developed and well-groomed.   HENT:      Head:  Normocephalic.   Cardiovascular:      Rate and Rhythm: Normal rate and regular rhythm.      Heart sounds: Normal heart sounds.   Pulmonary:      Effort: Pulmonary effort is normal.      Breath sounds: Normal breath sounds.   Skin:     General: Skin is warm and dry.   Neurological:      Mental Status: He is alert and oriented to person, place, and time.   Psychiatric:         Mood and Affect: Mood normal.         ASSESSMENT       Problems Addressed this Visit     Essential hypertension - Primary    Relevant Medications    lisinopril (PRINIVIL,ZESTRIL) 20 MG tablet    Dyslipidemia      Other Visit Diagnoses     Insomnia, unspecified type        Low testosterone in male          Diagnoses       Codes Comments    Essential hypertension    -  Primary ICD-10-CM: I10  ICD-9-CM: 401.9     Dyslipidemia     ICD-10-CM: E78.5  ICD-9-CM: 272.4     Insomnia, unspecified type     ICD-10-CM: G47.00  ICD-9-CM: 780.52     Low testosterone in male     ICD-10-CM: R79.89  ICD-9-CM: 790.99           PLAN    HTN- needs better control, continue exercise, continue with healthy diet, continue metoprolol, will increase lisinopril to 20mg daily, continue to monitor BP at home as needed  HLD- continue atorvastatin  He will call when he needs refills  He takes ambien sparingly as needed for insomnia, counseled patient 5mg is now recommended dose and recommend he cut in half, he will call for refills, Louis was reviewed   Follow up in 4 weeks with lipid panel, cmp, psa, UA, tsh, cbc, hepatitis c screening, testosterone or sooner if needed   Pt will obtain previous records from [facility] in about 4 weeks (around 5/26/2021) for Recheck, with labs.

## 2021-05-06 ENCOUNTER — TELEPHONE (OUTPATIENT)
Dept: INTERNAL MEDICINE | Facility: CLINIC | Age: 66
End: 2021-05-06

## 2021-05-06 RX ORDER — LISINOPRIL 20 MG/1
20 TABLET ORAL 2 TIMES DAILY
Qty: 180 TABLET | Refills: 1 | Status: SHIPPED | OUTPATIENT
Start: 2021-05-06 | End: 2022-01-27

## 2021-05-11 ENCOUNTER — TRANSCRIBE ORDERS (OUTPATIENT)
Dept: ADMINISTRATIVE | Facility: HOSPITAL | Age: 66
End: 2021-05-11

## 2021-05-11 DIAGNOSIS — Z13.6 ENCOUNTER FOR SCREENING FOR VASCULAR DISEASE: Primary | ICD-10-CM

## 2021-05-13 DIAGNOSIS — Z11.59 NEED FOR HEPATITIS C SCREENING TEST: ICD-10-CM

## 2021-05-13 DIAGNOSIS — R79.89 LOW TESTOSTERONE IN MALE: ICD-10-CM

## 2021-05-13 DIAGNOSIS — Z12.5 SCREENING PSA (PROSTATE SPECIFIC ANTIGEN): ICD-10-CM

## 2021-05-13 DIAGNOSIS — E78.5 DYSLIPIDEMIA: ICD-10-CM

## 2021-05-13 DIAGNOSIS — I10 ESSENTIAL HYPERTENSION: Primary | ICD-10-CM

## 2021-05-20 LAB
ALBUMIN SERPL-MCNC: 4.3 G/DL (ref 3.5–5.2)
ALBUMIN/GLOB SERPL: 1.9 G/DL
ALP SERPL-CCNC: 130 U/L (ref 39–117)
ALT SERPL-CCNC: 20 U/L (ref 1–41)
APPEARANCE UR: CLEAR
AST SERPL-CCNC: 16 U/L (ref 1–40)
BACTERIA #/AREA URNS HPF: NORMAL /HPF
BASOPHILS # BLD AUTO: 0.08 10*3/MM3 (ref 0–0.2)
BASOPHILS NFR BLD AUTO: 1 % (ref 0–1.5)
BILIRUB SERPL-MCNC: 0.7 MG/DL (ref 0–1.2)
BILIRUB UR QL STRIP: NEGATIVE
BUN SERPL-MCNC: 22 MG/DL (ref 8–23)
BUN/CREAT SERPL: 20.8 (ref 7–25)
CALCIUM SERPL-MCNC: 9.4 MG/DL (ref 8.6–10.5)
CASTS URNS MICRO: NORMAL
CHLORIDE SERPL-SCNC: 106 MMOL/L (ref 98–107)
CHOLEST SERPL-MCNC: 145 MG/DL (ref 0–200)
CO2 SERPL-SCNC: 26.1 MMOL/L (ref 22–29)
COLOR UR: YELLOW
CREAT SERPL-MCNC: 1.06 MG/DL (ref 0.76–1.27)
EOSINOPHIL # BLD AUTO: 0.3 10*3/MM3 (ref 0–0.4)
EOSINOPHIL NFR BLD AUTO: 3.9 % (ref 0.3–6.2)
EPI CELLS #/AREA URNS HPF: NORMAL /HPF
ERYTHROCYTE [DISTWIDTH] IN BLOOD BY AUTOMATED COUNT: 11.7 % (ref 12.3–15.4)
GLOBULIN SER CALC-MCNC: 2.3 GM/DL
GLUCOSE SERPL-MCNC: 111 MG/DL (ref 65–99)
GLUCOSE UR QL: NEGATIVE
HCT VFR BLD AUTO: 47.9 % (ref 37.5–51)
HCV AB S/CO SERPL IA: <0.1 S/CO RATIO (ref 0–0.9)
HCV AB SERPL QL IA: NORMAL
HDLC SERPL-MCNC: 47 MG/DL (ref 40–60)
HGB BLD-MCNC: 16.1 G/DL (ref 13–17.7)
HGB UR QL STRIP: NEGATIVE
IMM GRANULOCYTES # BLD AUTO: 0.02 10*3/MM3 (ref 0–0.05)
IMM GRANULOCYTES NFR BLD AUTO: 0.3 % (ref 0–0.5)
KETONES UR QL STRIP: NEGATIVE
LDLC SERPL CALC-MCNC: 83 MG/DL (ref 0–100)
LDLC/HDLC SERPL: 1.75 {RATIO}
LEUKOCYTE ESTERASE UR QL STRIP: NEGATIVE
LYMPHOCYTES # BLD AUTO: 1.55 10*3/MM3 (ref 0.7–3.1)
LYMPHOCYTES NFR BLD AUTO: 20.2 % (ref 19.6–45.3)
MCH RBC QN AUTO: 31.1 PG (ref 26.6–33)
MCHC RBC AUTO-ENTMCNC: 33.6 G/DL (ref 31.5–35.7)
MCV RBC AUTO: 92.5 FL (ref 79–97)
MONOCYTES # BLD AUTO: 0.78 10*3/MM3 (ref 0.1–0.9)
MONOCYTES NFR BLD AUTO: 10.1 % (ref 5–12)
NEUTROPHILS # BLD AUTO: 4.96 10*3/MM3 (ref 1.7–7)
NEUTROPHILS NFR BLD AUTO: 64.5 % (ref 42.7–76)
NITRITE UR QL STRIP: NEGATIVE
NRBC BLD AUTO-RTO: 0 /100 WBC (ref 0–0.2)
PH UR STRIP: 5.5 [PH] (ref 5–8)
PLATELET # BLD AUTO: 229 10*3/MM3 (ref 140–450)
POTASSIUM SERPL-SCNC: 4.4 MMOL/L (ref 3.5–5.2)
PROT SERPL-MCNC: 6.6 G/DL (ref 6–8.5)
PROT UR QL STRIP: NEGATIVE
PSA SERPL-MCNC: 0.35 NG/ML (ref 0–4)
RBC # BLD AUTO: 5.18 10*6/MM3 (ref 4.14–5.8)
RBC #/AREA URNS HPF: NORMAL /HPF
SODIUM SERPL-SCNC: 141 MMOL/L (ref 136–145)
SP GR UR: 1.02 (ref 1–1.03)
TESTOST SERPL-MCNC: 306 NG/DL (ref 264–916)
TRIGL SERPL-MCNC: 78 MG/DL (ref 0–150)
TSH SERPL DL<=0.005 MIU/L-ACNC: 0.82 UIU/ML (ref 0.27–4.2)
UROBILINOGEN UR STRIP-MCNC: NORMAL MG/DL
VLDLC SERPL CALC-MCNC: 15 MG/DL (ref 5–40)
WBC # BLD AUTO: 7.69 10*3/MM3 (ref 3.4–10.8)
WBC #/AREA URNS HPF: NORMAL /HPF

## 2021-05-26 ENCOUNTER — OFFICE VISIT (OUTPATIENT)
Dept: INTERNAL MEDICINE | Facility: CLINIC | Age: 66
End: 2021-05-26

## 2021-05-26 VITALS
BODY MASS INDEX: 32.13 KG/M2 | OXYGEN SATURATION: 98 % | WEIGHT: 212 LBS | TEMPERATURE: 97.1 F | DIASTOLIC BLOOD PRESSURE: 74 MMHG | HEART RATE: 70 BPM | HEIGHT: 68 IN | RESPIRATION RATE: 16 BRPM | SYSTOLIC BLOOD PRESSURE: 150 MMHG

## 2021-05-26 DIAGNOSIS — I10 ESSENTIAL HYPERTENSION: Primary | ICD-10-CM

## 2021-05-26 DIAGNOSIS — R73.01 ELEVATED FASTING GLUCOSE: ICD-10-CM

## 2021-05-26 DIAGNOSIS — E78.5 DYSLIPIDEMIA: ICD-10-CM

## 2021-05-26 PROCEDURE — 99214 OFFICE O/P EST MOD 30 MIN: CPT | Performed by: NURSE PRACTITIONER

## 2021-05-26 RX ORDER — HYDROCHLOROTHIAZIDE 25 MG/1
25 TABLET ORAL DAILY
Qty: 30 TABLET | Refills: 3 | Status: SHIPPED | OUTPATIENT
Start: 2021-05-26 | End: 2021-12-21

## 2021-05-26 NOTE — PROGRESS NOTES
Subjective   Janusz Pfeiffer is a 65 y.o. male. Patient is here today for   Chief Complaint   Patient presents with   • Hypertension          Vitals:    05/26/21 0848   BP: 150/74   Pulse: 70   Resp: 16   Temp: 97.1 °F (36.2 °C)   SpO2: 98%     Body mass index is 32.23 kg/m².  The following portions of the patient's history were reviewed and updated as appropriate: allergies, current medications, past family history, past medical history, past social history, past surgical history and problem list.    Past Medical History:   Diagnosis Date   • CAD (coronary artery disease)    • History of transfusion    • Hypocholesteremia    • Ischemic heart disease    • Keratosis    • Myocardial disease (CMS/HCC)    • Myocardial infarction (CMS/HCC)    • PAC (premature atrial contraction)    • PVC (premature ventricular contraction)    • Skin cancer of face       Allergies   Allergen Reactions   • Morphine And Related Hives     soa      Social History     Socioeconomic History   • Marital status:      Spouse name: Not on file   • Number of children: Not on file   • Years of education: Not on file   • Highest education level: Not on file   Tobacco Use   • Smoking status: Never Smoker   • Smokeless tobacco: Never Used   • Tobacco comment: no caffeine    Vaping Use   • Vaping Use: Never used   Substance and Sexual Activity   • Alcohol use: Yes     Comment: social   • Drug use: No   • Sexual activity: Defer        Current Outpatient Medications:   •  aspirin 81 MG tablet, Take 81 mg by mouth Daily., Disp: , Rfl:   •  atorvastatin (LIPITOR) 40 MG tablet, Take 40 mg by mouth Daily., Disp: , Rfl:   •  CBD (cannabidiol) oral oil, , Disp: , Rfl:   •  Cholecalciferol (VITAMIN D) 1000 units tablet, Take 1,000 Units by mouth Daily., Disp: , Rfl:   •  lisinopril (PRINIVIL,ZESTRIL) 20 MG tablet, Take 1 tablet by mouth 2 (two) times a day., Disp: 180 tablet, Rfl: 1  •  metoprolol tartrate (LOPRESSOR) 25 MG tablet, Take 25 mg by mouth 2  (Two) Times a Day., Disp: , Rfl:   •  tadalafil (Cialis) 5 MG tablet, , Disp: , Rfl:   •  zolpidem (Ambien) 10 MG tablet, , Disp: , Rfl:   •  hydroCHLOROthiazide (HYDRODIURIL) 25 MG tablet, Take 1 tablet by mouth Daily., Disp: 30 tablet, Rfl: 3     Objective     Janusz is a 65 year old male patient who is here for a follow up for HTN and to discuss lab results. He has HLD and CAD. He is followed by cardiology yearly     Hypertension  This is a chronic problem. The problem is uncontrolled. Associated symptoms include headaches (occasional ) and malaise/fatigue. Pertinent negatives include no blurred vision, chest pain, palpitations, peripheral edema or shortness of breath. There are no associated agents to hypertension. Risk factors for coronary artery disease include dyslipidemia and male gender. Past treatments include beta blockers and ACE inhibitors. Current antihypertension treatment includes ACE inhibitors and beta blockers. There are no compliance problems.  Hypertensive end-organ damage includes CAD/MI.   he denies snoring or witnessed apnea  I reviewed his blood pressure log and BP still is 130-160/80-90 .   I reviewed labs it him in detail  The following data was reviewed by: ZULEIKA Hankins on 05/26/2021:  Common labs    Common Labsle 5/19/21 5/19/21 5/19/21 5/19/21    0938 0938 0938 0938   Glucose  111 (A)     BUN  22     Creatinine  1.06     eGFR Non  Am  70     eGFR African Am  84     Sodium  141     Potassium  4.4     Chloride  106     Calcium  9.4     Total Protein  6.6     Albumin  4.30     Total Bilirubin  0.7     Alkaline Phosphatase  130 (A)     AST (SGOT)  16     ALT (SGPT)  20     WBC 7.69      Hemoglobin 16.1      Hematocrit 47.9      Platelets 229      Total Cholesterol   145    Triglycerides   78    HDL Cholesterol   47    LDL Cholesterol    83    PSA    0.347   (A) Abnormal value       Comments are available for some flowsheets but are not being displayed.           Data  reviewed: Consultant notes cardiology        Review of Systems   Constitutional: Positive for fatigue and malaise/fatigue.   Eyes: Negative for blurred vision and visual disturbance.   Respiratory: Negative for shortness of breath.    Cardiovascular: Negative for chest pain and palpitations.   Gastrointestinal: Negative.    Musculoskeletal: Negative.    Neurological: Positive for headaches (occasional ). Negative for dizziness.       Physical Exam  Vitals and nursing note reviewed.   Constitutional:       Appearance: Normal appearance. He is well-developed and well-groomed.   HENT:      Head: Normocephalic.   Cardiovascular:      Rate and Rhythm: Normal rate and regular rhythm.   Pulmonary:      Effort: Pulmonary effort is normal.      Breath sounds: Normal breath sounds.   Neurological:      Mental Status: He is alert.         ASSESSMENT     Problems Addressed this Visit     Essential hypertension - Primary    Relevant Medications    hydroCHLOROthiazide (HYDRODIURIL) 25 MG tablet    Dyslipidemia      Other Visit Diagnoses     Elevated fasting glucose          Diagnoses       Codes Comments    Essential hypertension    -  Primary ICD-10-CM: I10  ICD-9-CM: 401.9     Dyslipidemia     ICD-10-CM: E78.5  ICD-9-CM: 272.4     Elevated fasting glucose     ICD-10-CM: R73.01  ICD-9-CM: 790.21           PLAN  HTN needs better control- kidney function is normal, continue lisinopril and metoprolol. Will add hctz , will need to recheck a BMP in 4-6 weeks. 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 Continue to monitor BP at home. He denies any symptoms of JEFFREY, discussed Renal artery US if BP still high  Pt will send BP readings to me through my chart  HLD is well controlled continue atorvastatin   Fasting glucose was elevated at 111, discussed dietary changes   Return in about 6 months (around 11/26/2021) for Recheck, with labs. or sooner if needed

## 2021-06-17 ENCOUNTER — APPOINTMENT (OUTPATIENT)
Dept: WOMENS IMAGING | Facility: HOSPITAL | Age: 66
End: 2021-06-17

## 2021-06-17 PROCEDURE — 71046 X-RAY EXAM CHEST 2 VIEWS: CPT | Performed by: RADIOLOGY

## 2021-06-18 ENCOUNTER — HOSPITAL ENCOUNTER (OUTPATIENT)
Dept: CARDIOLOGY | Facility: HOSPITAL | Age: 66
Discharge: HOME | End: 2021-06-18
Admitting: NURSE PRACTITIONER

## 2021-06-18 VITALS
BODY MASS INDEX: 31.67 KG/M2 | HEIGHT: 68 IN | DIASTOLIC BLOOD PRESSURE: 65 MMHG | WEIGHT: 209 LBS | HEART RATE: 53 BPM | SYSTOLIC BLOOD PRESSURE: 124 MMHG

## 2021-06-18 DIAGNOSIS — Z13.6 ENCOUNTER FOR SCREENING FOR VASCULAR DISEASE: ICD-10-CM

## 2021-06-18 LAB
BH CV ECHO MEAS - DIST AO DIAM: 1.49 CM
BH CV VAS BP LEFT ARM: NORMAL MMHG
BH CV VAS BP RIGHT ARM: NORMAL MMHG
BH CV XLRA MEAS - MID AO DIAM: 1.74 CM
BH CV XLRA MEAS - PAD LEFT ABI DP: 1.25
BH CV XLRA MEAS - PAD LEFT ABI PT: 1.33
BH CV XLRA MEAS - PAD LEFT ARM: 124 MMHG
BH CV XLRA MEAS - PAD LEFT LEG DP: 155 MMHG
BH CV XLRA MEAS - PAD LEFT LEG PT: 165 MMHG
BH CV XLRA MEAS - PAD RIGHT ABI DP: 1.29
BH CV XLRA MEAS - PAD RIGHT ABI PT: 1.31
BH CV XLRA MEAS - PAD RIGHT ARM: 121 MMHG
BH CV XLRA MEAS - PAD RIGHT LEG DP: 160 MMHG
BH CV XLRA MEAS - PAD RIGHT LEG PT: 163 MMHG
BH CV XLRA MEAS - PROX AO DIAM: 2.04 CM
BH CV XLRA MEAS LEFT ICA/CCA RATIO: 1.26
BH CV XLRA MEAS LEFT MID CCA PSV: NORMAL CM/SEC
BH CV XLRA MEAS LEFT MID ICA PSV: NORMAL CM/SEC
BH CV XLRA MEAS LEFT PROX ECA PSV: NORMAL CM/SEC
BH CV XLRA MEAS RIGHT ICA/CCA RATIO: 0.98
BH CV XLRA MEAS RIGHT MID CCA PSV: NORMAL CM/SEC
BH CV XLRA MEAS RIGHT MID ICA PSV: NORMAL CM/SEC
BH CV XLRA MEAS RIGHT PROX ECA PSV: NORMAL CM/SEC

## 2021-06-18 PROCEDURE — 93799 UNLISTED CV SVC/PROCEDURE: CPT

## 2021-06-21 ENCOUNTER — TELEPHONE (OUTPATIENT)
Dept: INTERNAL MEDICINE | Facility: CLINIC | Age: 66
End: 2021-06-21

## 2021-06-21 NOTE — TELEPHONE ENCOUNTER
----- Message from ZULEIKA Quinones sent at 6/21/2021  8:20 AM EDT -----  Please notify patient of results

## 2021-06-25 RX ORDER — ATORVASTATIN CALCIUM 40 MG/1
40 TABLET, FILM COATED ORAL DAILY
Qty: 90 TABLET | Refills: 1 | Status: SHIPPED | OUTPATIENT
Start: 2021-06-25 | End: 2022-01-03

## 2021-06-25 NOTE — TELEPHONE ENCOUNTER
----- Message from Janusz Pfeiffer sent at 6/25/2021  1:10 PM EDT -----  Regarding: Prescription Question  Contact: [phone]  I am being advised by [facility] that they do not have an active prescription on file for the metoprolol nor the atorvastatin prescriptions I am taking.  Both of them are due for a refill.  The Metoprolol Tartrate is 25 mg twice a day, the Atorvastatin is 40 mg each day.

## 2021-11-16 DIAGNOSIS — E78.5 DYSLIPIDEMIA: Primary | ICD-10-CM

## 2021-11-17 LAB
ALBUMIN SERPL-MCNC: 4.1 G/DL (ref 3.8–4.8)
ALBUMIN/GLOB SERPL: 1.6 {RATIO} (ref 1.2–2.2)
ALP SERPL-CCNC: 109 IU/L (ref 44–121)
ALT SERPL-CCNC: 18 IU/L (ref 0–44)
AST SERPL-CCNC: 21 IU/L (ref 0–40)
BILIRUB SERPL-MCNC: 0.7 MG/DL (ref 0–1.2)
BUN SERPL-MCNC: 22 MG/DL (ref 8–27)
BUN/CREAT SERPL: 21 (ref 10–24)
CALCIUM SERPL-MCNC: 9.2 MG/DL (ref 8.6–10.2)
CHLORIDE SERPL-SCNC: 104 MMOL/L (ref 96–106)
CHOLEST SERPL-MCNC: 167 MG/DL (ref 100–199)
CO2 SERPL-SCNC: 27 MMOL/L (ref 20–29)
CREAT SERPL-MCNC: 1.06 MG/DL (ref 0.76–1.27)
GLOBULIN SER CALC-MCNC: 2.6 G/DL (ref 1.5–4.5)
GLUCOSE SERPL-MCNC: 120 MG/DL (ref 65–99)
HDLC SERPL-MCNC: 48 MG/DL
LDLC SERPL CALC-MCNC: 101 MG/DL (ref 0–99)
POTASSIUM SERPL-SCNC: 4.5 MMOL/L (ref 3.5–5.2)
PROT SERPL-MCNC: 6.7 G/DL (ref 6–8.5)
SODIUM SERPL-SCNC: 141 MMOL/L (ref 134–144)
TRIGL SERPL-MCNC: 100 MG/DL (ref 0–149)
VLDLC SERPL CALC-MCNC: 18 MG/DL (ref 5–40)

## 2021-11-22 ENCOUNTER — OFFICE VISIT (OUTPATIENT)
Dept: INTERNAL MEDICINE | Facility: CLINIC | Age: 66
End: 2021-11-22

## 2021-11-22 VITALS
RESPIRATION RATE: 16 BRPM | HEIGHT: 68 IN | WEIGHT: 209 LBS | OXYGEN SATURATION: 98 % | DIASTOLIC BLOOD PRESSURE: 78 MMHG | HEART RATE: 54 BPM | SYSTOLIC BLOOD PRESSURE: 128 MMHG | BODY MASS INDEX: 31.67 KG/M2 | TEMPERATURE: 97.7 F

## 2021-11-22 DIAGNOSIS — E78.5 DYSLIPIDEMIA: Primary | ICD-10-CM

## 2021-11-22 DIAGNOSIS — Z23 NEED FOR PNEUMOCOCCAL VACCINE: ICD-10-CM

## 2021-11-22 DIAGNOSIS — R73.01 IMPAIRED FASTING GLUCOSE: ICD-10-CM

## 2021-11-22 DIAGNOSIS — I10 ESSENTIAL HYPERTENSION: ICD-10-CM

## 2021-11-22 PROCEDURE — 90471 IMMUNIZATION ADMIN: CPT | Performed by: NURSE PRACTITIONER

## 2021-11-22 PROCEDURE — 99214 OFFICE O/P EST MOD 30 MIN: CPT | Performed by: NURSE PRACTITIONER

## 2021-11-22 PROCEDURE — 90732 PPSV23 VACC 2 YRS+ SUBQ/IM: CPT | Performed by: NURSE PRACTITIONER

## 2021-11-22 NOTE — PROGRESS NOTES
Subjective   Janusz Pfeiffer is a 66 y.o. male. Patient is here today for   Chief Complaint   Patient presents with   • Hypertension   Answers for HPI/ROS submitted by the patient on 11/15/2021  What is the primary reason for your visit?: High Blood Pressure           Vitals:    11/22/21 0933   BP: 128/78   Pulse: 54   Resp: 16   Temp: 97.7 °F (36.5 °C)   SpO2: 98%     Body mass index is 31.78 kg/m².  The following portions of the patient's history were reviewed and updated as appropriate: allergies, current medications, past family history, past medical history, past social history, past surgical history and problem list.    Past Medical History:   Diagnosis Date   • Allergic    • Benign prostatic hyperplasia    • CAD (coronary artery disease)    • History of transfusion    • Hypocholesteremia    • Ischemic heart disease    • Keratosis    • Myocardial disease (HCC)    • Myocardial infarction (HCC)    • PAC (premature atrial contraction)    • PVC (premature ventricular contraction)    • Skin cancer of face       Allergies   Allergen Reactions   • Morphine And Related Hives     soa      Social History     Socioeconomic History   • Marital status:    Tobacco Use   • Smoking status: Never Smoker   • Smokeless tobacco: Never Used   • Tobacco comment: no caffeine    Vaping Use   • Vaping Use: Never used   Substance and Sexual Activity   • Alcohol use: Yes     Alcohol/week: 4.0 standard drinks     Types: 4 Glasses of wine per week     Comment: social   • Drug use: No   • Sexual activity: Yes     Partners: Female        Current Outpatient Medications:   •  aspirin 81 MG tablet, Take 81 mg by mouth Daily., Disp: , Rfl:   •  atorvastatin (LIPITOR) 40 MG tablet, Take 1 tablet by mouth Daily., Disp: 90 tablet, Rfl: 1  •  CBD (cannabidiol) oral oil, , Disp: , Rfl:   •  Cholecalciferol (VITAMIN D) 1000 units tablet, Take 1,000 Units by mouth Daily., Disp: , Rfl:   •  hydroCHLOROthiazide (HYDRODIURIL) 25 MG tablet, Take 1  tablet by mouth Daily., Disp: 30 tablet, Rfl: 3  •  lisinopril (PRINIVIL,ZESTRIL) 20 MG tablet, Take 1 tablet by mouth 2 (two) times a day., Disp: 180 tablet, Rfl: 1  •  metoprolol tartrate (LOPRESSOR) 25 MG tablet, Take 1 tablet by mouth 2 (Two) Times a Day., Disp: 180 tablet, Rfl: 1  •  tadalafil (Cialis) 5 MG tablet, , Disp: , Rfl:   •  zolpidem (Ambien) 10 MG tablet, , Disp: , Rfl:      Objective     History of Present Illness  Mr Pfeiffer is a 66 year old male patient who is here for a follow up on HLD and HTN. He has been doing well with no interval events. BP has been well controlled at home. He is compliant with his medication. He has a dry cough from lisinopril but is not bothering him to the point that he needs to change his medication. . He is followed by cardiology yearly. He eats a healthy diet and exercises daily.   I reviewed labs with him in detail  Orders Only on 11/16/2021   Component Date Value Ref Range Status   • Glucose 11/16/2021 120* 65 - 99 mg/dL Final   • BUN 11/16/2021 22  8 - 27 mg/dL Final   • Creatinine 11/16/2021 1.06  0.76 - 1.27 mg/dL Final   • eGFR Non  Am 11/16/2021 72  >59 mL/min/1.73 Final   • eGFR African Am 11/16/2021 83  >59 mL/min/1.73 Final    Comment: **In accordance with recommendations from the NKF-ASN Task force,**    [facility] the [facility] eGFR calculation to the    2021 CKD-EPI creatinine equation that estimates kidney function    without a race variable.     • BUN/Creatinine Ratio 11/16/2021 21  10 - 24 Final   • Sodium 11/16/2021 141  134 - 144 mmol/L Final   • Potassium 11/16/2021 4.5  3.5 - 5.2 mmol/L Final   • Chloride 11/16/2021 104  96 - 106 mmol/L Final   • Total CO2 11/16/2021 27  20 - 29 mmol/L Final   • Calcium 11/16/2021 9.2  8.6 - 10.2 mg/dL Final   • Total Protein 11/16/2021 6.7  6.0 - 8.5 g/dL Final   • Albumin 11/16/2021 4.1  3.8 - 4.8 g/dL Final   • Globulin 11/16/2021 2.6  1.5 - 4.5 g/dL Final   • A/G Ratio 11/16/2021 1.6  1.2  - 2.2 Final   • Total Bilirubin 11/16/2021 0.7  0.0 - 1.2 mg/dL Final   • Alkaline Phosphatase 11/16/2021 109  44 - 121 IU/L Final                  **Please note reference interval change**   • AST (SGOT) 11/16/2021 21  0 - 40 IU/L Final   • ALT (SGPT) 11/16/2021 18  0 - 44 IU/L Final   • Total Cholesterol 11/16/2021 167  100 - 199 mg/dL Final   • Triglycerides 11/16/2021 100  0 - 149 mg/dL Final   • HDL Cholesterol 11/16/2021 48  >39 mg/dL Final   • VLDL Cholesterol Efrain 11/16/2021 18  5 - 40 mg/dL Final   • LDL Chol Calc (NIH) 11/16/2021 101* 0 - 99 mg/dL Final         Review of Systems   Constitutional: Negative for fatigue.   HENT: Negative.    Respiratory: Negative for shortness of breath.    Cardiovascular: Negative for chest pain and palpitations.   Gastrointestinal: Negative.    Musculoskeletal: Negative for neck pain.   Neurological: Negative for headaches.       Physical Exam  Vitals and nursing note reviewed.   Constitutional:       Appearance: Normal appearance. He is well-developed and well-groomed.   HENT:      Head: Normocephalic.   Cardiovascular:      Rate and Rhythm: Normal rate and regular rhythm.   Pulmonary:      Effort: Pulmonary effort is normal.      Breath sounds: Normal breath sounds.   Skin:     General: Skin is warm and dry.   Neurological:      Mental Status: He is alert and oriented to person, place, and time.         ASSESSMENT     Problems Addressed this Visit     Essential hypertension    Dyslipidemia - Primary      Other Visit Diagnoses     Need for pneumococcal vaccine        Relevant Orders    Pneumococcal Polysaccharide Vaccine 23-Valent Greater Than or Equal To 1yo Subcutaneous / IM (Completed)    Impaired fasting glucose          Diagnoses       Codes Comments    Dyslipidemia    -  Primary ICD-10-CM: E78.5  ICD-9-CM: 272.4     Essential hypertension     ICD-10-CM: I10  ICD-9-CM: 401.9     Need for pneumococcal vaccine     ICD-10-CM: Z23  ICD-9-CM: V03.82     Impaired fasting  glucose     ICD-10-CM: R73.01  ICD-9-CM: 790.21           PLAN  1. HLD/CAD- continue atorvastatin, continue with diet and exercise and follow up with Dr Mosley as scheduled, LDL is 101, D/w Patient recommend Target is less than 70. Pt will discuss with Dr Mosley .   2. HTN is well controlled   3. Impaired fasting glucose- will continue dietary changes and exercise- will check a1c at next visit   4. HM- will get pneumonia vaccine today   Will tdap at next visit or sooner if injury   Return in about 6 months (around 5/22/2022) for Annual physical, with labs. including PSA and a1c

## 2021-12-21 RX ORDER — HYDROCHLOROTHIAZIDE 25 MG/1
25 TABLET ORAL DAILY
Qty: 30 TABLET | Refills: 3 | Status: SHIPPED | OUTPATIENT
Start: 2021-12-21 | End: 2022-03-21

## 2022-01-03 RX ORDER — ATORVASTATIN CALCIUM 40 MG/1
40 TABLET, FILM COATED ORAL DAILY
Qty: 90 TABLET | Refills: 1 | Status: SHIPPED | OUTPATIENT
Start: 2022-01-03 | End: 2022-06-17

## 2022-01-25 ENCOUNTER — OFFICE VISIT (OUTPATIENT)
Dept: CARDIOLOGY | Facility: CLINIC | Age: 67
End: 2022-01-25

## 2022-01-25 VITALS
WEIGHT: 200.4 LBS | DIASTOLIC BLOOD PRESSURE: 80 MMHG | BODY MASS INDEX: 30.37 KG/M2 | SYSTOLIC BLOOD PRESSURE: 126 MMHG | HEIGHT: 68 IN | HEART RATE: 52 BPM

## 2022-01-25 DIAGNOSIS — Z95.1 S/P CABG X 4: Primary | ICD-10-CM

## 2022-01-25 DIAGNOSIS — I10 ESSENTIAL HYPERTENSION: ICD-10-CM

## 2022-01-25 PROCEDURE — 93000 ELECTROCARDIOGRAM COMPLETE: CPT | Performed by: INTERNAL MEDICINE

## 2022-01-25 PROCEDURE — 99214 OFFICE O/P EST MOD 30 MIN: CPT | Performed by: INTERNAL MEDICINE

## 2022-01-25 NOTE — PROGRESS NOTES
"      CARDIOLOGY    Phan Mosley MD    ENCOUNTER DATE:  01/25/2022    Janusz Pfeiffer / 66 y.o. / male        CHIEF COMPLAINT / REASON FOR OFFICE VISIT     Essential hypertension (01/19/2021 Follow up)  Coronary artery disease    HISTORY OF PRESENT ILLNESS       HPI  Janusz Pfeiffer is a 66 y.o. male who presents today for reevaluation.  Patient says he is doing great.  His blood pressure did go up this year we increased his lisinopril and hydrochlorothiazide has remained stable.  He exercises routinely his beta-blocker keeps his heart rate under control.  He denies chest pain shortness of breath palpitations lightheadedness swelling or fatigue      The following portions of the patient's history were reviewed and updated as appropriate: allergies, current medications, past family history, past medical history, past social history, past surgical history and problem list.      VITAL SIGNS     Visit Vitals  /80 (BP Location: Left arm)   Pulse 52   Ht 172.7 cm (68\")   Wt 90.9 kg (200 lb 6.4 oz)   BMI 30.47 kg/m²         Wt Readings from Last 3 Encounters:   01/25/22 90.9 kg (200 lb 6.4 oz)   11/22/21 94.8 kg (209 lb)   06/18/21 94.8 kg (209 lb)     Body mass index is 30.47 kg/m².      REVIEW OF SYSTEMS   Review of Systems   All other systems reviewed and are negative.          PHYSICAL EXAMINATION     Vitals reviewed.   Constitutional:       Appearance: Healthy appearance.   Pulmonary:      Effort: Pulmonary effort is normal.   Cardiovascular:      Normal rate. Regular rhythm. Normal S1. Normal S2.      Murmurs: There is no murmur.      No gallop. No click. No rub.   Pulses:     Intact distal pulses.   Edema:     Peripheral edema absent.   Neurological:      Mental Status: Alert and oriented to person, place and time.           REVIEWED DATA       ECG 12 Lead    Date/Time: 1/25/2022 11:44 AM  Performed by: Phan Mosley MD  Authorized by: Phan Mosley MD   Comparison: compared with previous " ECG from 1/19/2021  Similar to previous ECG  Rhythm: sinus rhythm  Conduction: incomplete left bundle branch block  Other findings: non-specific ST-T wave changes    Clinical impression: abnormal EKG            Cardiac Procedures:  1.     Lipid Panel    Lipid Panel 5/19/21 11/16/21   Total Cholesterol 145 167   Triglycerides 78 100   HDL Cholesterol 47 48   VLDL Cholesterol 15 18   LDL Cholesterol  83 101 (A)   LDL/HDL Ratio 1.75    (A) Abnormal value       Comments are available for some flowsheets but are not being displayed.               ASSESSMENT & PLAN      Diagnosis Plan   1. S/P CABG x 4     2. Essential hypertension           SUMMARY/DISCUSSION  1. Coronary artery disease.  Status post coronary artery bypass grafting doing fine.  2. Hyperlipidemia.  I would like his LDL around 70.  He is losing weight exercising consistently.  It was at about 83 it popped up a little bit but he is working on it he is very aware.  3. Hypertension blood pressure is back under control doing well.  4. Follow-up 1 year sooner if issues        MEDICATIONS         Discharge Medications          Accurate as of January 25, 2022 11:45 AM. If you have any questions, ask your nurse or doctor.            Continue These Medications      Instructions Start Date   Ambien 10 MG tablet  Generic drug: zolpidem   No dose, route, or frequency recorded.      aspirin 81 MG tablet   81 mg, Oral, Daily      atorvastatin 40 MG tablet  Commonly known as: LIPITOR   40 mg, Oral, Daily      CBD oral oil  Commonly known as: cannabidiol   No dose, route, or frequency recorded.      cholecalciferol 25 MCG (1000 UT) tablet  Commonly known as: VITAMIN D3   1,000 Units, Oral, Daily      Cialis 5 MG tablet  Generic drug: tadalafil   No dose, route, or frequency recorded.      hydroCHLOROthiazide 25 MG tablet  Commonly known as: HYDRODIURIL   25 mg, Oral, Daily      lisinopril 20 MG tablet  Commonly known as: PRINIVIL,ZESTRIL   20 mg, Oral, 2 times daily       metoprolol tartrate 25 MG tablet  Commonly known as: LOPRESSOR   TAKE 1 TABLET BY MOUTH TWICE DAILY                 **Dragon Disclaimer:   Much of this encounter note is an electronic transcription/translation of spoken language to printed text. The electronic translation of spoken language may permit erroneous, or at times, nonsensical words or phrases to be inadvertently transcribed. Although I have reviewed the note for such errors, some may still exist.

## 2022-01-27 ENCOUNTER — TELEPHONE (OUTPATIENT)
Dept: INTERNAL MEDICINE | Facility: CLINIC | Age: 67
End: 2022-01-27

## 2022-01-27 RX ORDER — LISINOPRIL 20 MG/1
20 TABLET ORAL 2 TIMES DAILY
Qty: 180 TABLET | Refills: 0 | Status: SHIPPED | OUTPATIENT
Start: 2022-01-27 | End: 2022-04-26

## 2022-01-27 RX ORDER — LISINOPRIL 20 MG/1
TABLET ORAL
Qty: 90 TABLET | Refills: 0 | Status: SHIPPED | OUTPATIENT
Start: 2022-01-27 | End: 2022-01-27 | Stop reason: SDUPTHER

## 2022-01-27 NOTE — TELEPHONE ENCOUNTER
Per telephone encounter 05/06/21 lisinoprl changed to BID.  RX sent to pharmacy. Will notify patient

## 2022-01-27 NOTE — TELEPHONE ENCOUNTER
----- Message from Janusz Pfeiffer sent at 1/27/2022 10:14 AM EST -----  Regarding: Lisinopril Prescription  Some time ago, you increased my Lisinopril prescription from 20mg once a day to 20 mg twice a day.  It's working.    As a result, I am going through my prescription Lisinopril much faster. When I tried to refill it yesterday, with only a few days left, I was advised by the pharmacy that insurance stated it was too soon to refill.     Do I need a new prescription that reflects the doubling of the drug?

## 2022-03-21 RX ORDER — HYDROCHLOROTHIAZIDE 25 MG/1
25 TABLET ORAL DAILY
Qty: 30 TABLET | Refills: 3 | Status: SHIPPED | OUTPATIENT
Start: 2022-03-21 | End: 2022-06-17

## 2022-03-21 RX ORDER — HYDROCHLOROTHIAZIDE 25 MG/1
25 TABLET ORAL DAILY
Qty: 30 TABLET | Refills: 3 | Status: SHIPPED | OUTPATIENT
Start: 2022-03-21 | End: 2022-05-26 | Stop reason: SDUPTHER

## 2022-04-26 RX ORDER — LISINOPRIL 20 MG/1
TABLET ORAL
Qty: 180 TABLET | Refills: 0 | Status: SHIPPED | OUTPATIENT
Start: 2022-04-26 | End: 2022-05-26

## 2022-05-06 DIAGNOSIS — R73.01 ELEVATED FASTING GLUCOSE: ICD-10-CM

## 2022-05-06 DIAGNOSIS — Z12.5 SCREENING PSA (PROSTATE SPECIFIC ANTIGEN): ICD-10-CM

## 2022-05-06 DIAGNOSIS — Z00.00 ROUTINE HEALTH MAINTENANCE: Primary | ICD-10-CM

## 2022-05-20 LAB
ALBUMIN SERPL-MCNC: 4.1 G/DL (ref 3.8–4.8)
ALBUMIN/GLOB SERPL: 1.8 {RATIO} (ref 1.2–2.2)
ALP SERPL-CCNC: 104 IU/L (ref 44–121)
ALT SERPL-CCNC: 20 IU/L (ref 0–44)
APPEARANCE UR: CLEAR
AST SERPL-CCNC: 19 IU/L (ref 0–40)
BACTERIA #/AREA URNS HPF: NORMAL /[HPF]
BASOPHILS # BLD AUTO: 0.1 X10E3/UL (ref 0–0.2)
BASOPHILS NFR BLD AUTO: 1 %
BILIRUB SERPL-MCNC: 0.7 MG/DL (ref 0–1.2)
BILIRUB UR QL STRIP: NEGATIVE
BUN SERPL-MCNC: 22 MG/DL (ref 8–27)
BUN/CREAT SERPL: 19 (ref 10–24)
CALCIUM SERPL-MCNC: 9.1 MG/DL (ref 8.6–10.2)
CASTS URNS QL MICRO: NORMAL /LPF
CHLORIDE SERPL-SCNC: 101 MMOL/L (ref 96–106)
CHOLEST SERPL-MCNC: 166 MG/DL (ref 100–199)
CO2 SERPL-SCNC: 24 MMOL/L (ref 20–29)
COLOR UR: YELLOW
CREAT SERPL-MCNC: 1.16 MG/DL (ref 0.76–1.27)
EGFRCR SERPLBLD CKD-EPI 2021: 69 ML/MIN/1.73
EOSINOPHIL # BLD AUTO: 0.2 X10E3/UL (ref 0–0.4)
EOSINOPHIL NFR BLD AUTO: 3 %
EPI CELLS #/AREA URNS HPF: NORMAL /HPF (ref 0–10)
ERYTHROCYTE [DISTWIDTH] IN BLOOD BY AUTOMATED COUNT: 11.8 % (ref 11.6–15.4)
GLOBULIN SER CALC-MCNC: 2.3 G/DL (ref 1.5–4.5)
GLUCOSE SERPL-MCNC: 106 MG/DL (ref 65–99)
GLUCOSE UR QL STRIP: NEGATIVE
HBA1C MFR BLD: 6.2 % (ref 4.8–5.6)
HCT VFR BLD AUTO: 44.7 % (ref 37.5–51)
HDLC SERPL-MCNC: 47 MG/DL
HGB BLD-MCNC: 15.1 G/DL (ref 13–17.7)
HGB UR QL STRIP: NEGATIVE
IMM GRANULOCYTES # BLD AUTO: 0 X10E3/UL (ref 0–0.1)
IMM GRANULOCYTES NFR BLD AUTO: 0 %
KETONES UR QL STRIP: NEGATIVE
LDLC SERPL CALC-MCNC: 102 MG/DL (ref 0–99)
LDLC/HDLC SERPL: 2.2 RATIO (ref 0–3.6)
LEUKOCYTE ESTERASE UR QL STRIP: NEGATIVE
LYMPHOCYTES # BLD AUTO: 1.6 X10E3/UL (ref 0.7–3.1)
LYMPHOCYTES NFR BLD AUTO: 24 %
MCH RBC QN AUTO: 31.5 PG (ref 26.6–33)
MCHC RBC AUTO-ENTMCNC: 33.8 G/DL (ref 31.5–35.7)
MCV RBC AUTO: 93 FL (ref 79–97)
MICRO URNS: NORMAL
MICRO URNS: NORMAL
MONOCYTES # BLD AUTO: 0.7 X10E3/UL (ref 0.1–0.9)
MONOCYTES NFR BLD AUTO: 11 %
NEUTROPHILS # BLD AUTO: 4.3 X10E3/UL (ref 1.4–7)
NEUTROPHILS NFR BLD AUTO: 61 %
NITRITE UR QL STRIP: NEGATIVE
PH UR STRIP: 6 [PH] (ref 5–7.5)
PLATELET # BLD AUTO: 257 X10E3/UL (ref 150–450)
POTASSIUM SERPL-SCNC: 4.3 MMOL/L (ref 3.5–5.2)
PROT SERPL-MCNC: 6.4 G/DL (ref 6–8.5)
PROT UR QL STRIP: NEGATIVE
PSA SERPL-MCNC: 0.3 NG/ML (ref 0–4)
RBC # BLD AUTO: 4.79 X10E6/UL (ref 4.14–5.8)
RBC #/AREA URNS HPF: NORMAL /HPF (ref 0–2)
SODIUM SERPL-SCNC: 140 MMOL/L (ref 134–144)
SP GR UR STRIP: 1.02 (ref 1–1.03)
TRIGL SERPL-MCNC: 94 MG/DL (ref 0–149)
TSH SERPL DL<=0.005 MIU/L-ACNC: 0.88 UIU/ML (ref 0.45–4.5)
UROBILINOGEN UR STRIP-MCNC: 1 MG/DL (ref 0.2–1)
VLDLC SERPL CALC-MCNC: 17 MG/DL (ref 5–40)
WBC # BLD AUTO: 6.9 X10E3/UL (ref 3.4–10.8)
WBC #/AREA URNS HPF: NORMAL /HPF (ref 0–5)

## 2022-05-26 ENCOUNTER — OFFICE VISIT (OUTPATIENT)
Dept: INTERNAL MEDICINE | Facility: CLINIC | Age: 67
End: 2022-05-26

## 2022-05-26 VITALS
DIASTOLIC BLOOD PRESSURE: 80 MMHG | HEIGHT: 68 IN | WEIGHT: 198 LBS | RESPIRATION RATE: 16 BRPM | SYSTOLIC BLOOD PRESSURE: 136 MMHG | BODY MASS INDEX: 30.01 KG/M2 | OXYGEN SATURATION: 97 % | HEART RATE: 56 BPM

## 2022-05-26 DIAGNOSIS — Z23 NEED FOR TETANUS, DIPHTHERIA, AND ACELLULAR PERTUSSIS (TDAP) VACCINE IN PATIENT OF ADOLESCENT AGE OR OLDER: ICD-10-CM

## 2022-05-26 DIAGNOSIS — I10 ESSENTIAL HYPERTENSION: ICD-10-CM

## 2022-05-26 DIAGNOSIS — Z00.00 ANNUAL PHYSICAL EXAM: Primary | ICD-10-CM

## 2022-05-26 DIAGNOSIS — G47.00 INSOMNIA, UNSPECIFIED TYPE: ICD-10-CM

## 2022-05-26 DIAGNOSIS — E78.5 DYSLIPIDEMIA: ICD-10-CM

## 2022-05-26 PROBLEM — Z12.11 SCREEN FOR COLON CANCER: Status: RESOLVED | Noted: 2019-01-21 | Resolved: 2022-05-26

## 2022-05-26 PROCEDURE — 99397 PER PM REEVAL EST PAT 65+ YR: CPT | Performed by: NURSE PRACTITIONER

## 2022-05-26 PROCEDURE — 99214 OFFICE O/P EST MOD 30 MIN: CPT | Performed by: NURSE PRACTITIONER

## 2022-05-26 PROCEDURE — 90715 TDAP VACCINE 7 YRS/> IM: CPT | Performed by: NURSE PRACTITIONER

## 2022-05-26 PROCEDURE — 90471 IMMUNIZATION ADMIN: CPT | Performed by: NURSE PRACTITIONER

## 2022-05-26 RX ORDER — ZOLPIDEM TARTRATE 5 MG/1
5 TABLET ORAL NIGHTLY PRN
Qty: 30 TABLET | Refills: 1 | Status: SHIPPED | OUTPATIENT
Start: 2022-05-26

## 2022-05-26 RX ORDER — LISINOPRIL 20 MG/1
20 TABLET ORAL DAILY
Qty: 180 TABLET | Refills: 0
Start: 2022-05-26 | End: 2022-09-20 | Stop reason: SDUPTHER

## 2022-05-26 NOTE — PROGRESS NOTES
Subjective   Janusz Pfeiffer is a 66 y.o. male. Patient is here today for   Chief Complaint   Patient presents with   • Hyperlipidemia   • Hypertension   • Annual Exam          Vitals:    05/26/22 0808   BP: 136/80   Pulse: 56   Resp: 16   SpO2: 97%     Body mass index is 30.11 kg/m².    The following portions of the patient's history were reviewed and updated as appropriate: allergies, current medications, past family history, past medical history, past social history, past surgical history and problem list.    Past Medical History:   Diagnosis Date   • Allergic    • Benign prostatic hyperplasia    • CAD (coronary artery disease)    • History of transfusion    • Hypocholesteremia    • Ischemic heart disease    • Keratosis    • Myocardial disease (HCC)    • Myocardial infarction (HCC)    • PAC (premature atrial contraction)    • PVC (premature ventricular contraction)    • Skin cancer of face       Allergies   Allergen Reactions   • Morphine And Related Hives     soa      Social History     Socioeconomic History   • Marital status:    Tobacco Use   • Smoking status: Never Smoker   • Smokeless tobacco: Never Used   • Tobacco comment: no caffeine    Vaping Use   • Vaping Use: Never used   Substance and Sexual Activity   • Alcohol use: Yes     Alcohol/week: 4.0 standard drinks     Types: 4 Glasses of wine per week     Comment: social   • Drug use: No   • Sexual activity: Yes     Partners: Female        Current Outpatient Medications:   •  aspirin 81 MG tablet, Take 81 mg by mouth Daily., Disp: , Rfl:   •  atorvastatin (LIPITOR) 40 MG tablet, TAKE 1 TABLET BY MOUTH DAILY, Disp: 90 tablet, Rfl: 1  •  CBD (cannabidiol) oral oil, , Disp: , Rfl:   •  Cholecalciferol (VITAMIN D) 1000 units tablet, Take 1,000 Units by mouth Daily., Disp: , Rfl:   •  hydroCHLOROthiazide (HYDRODIURIL) 25 MG tablet, TAKE 1 TABLET BY MOUTH DAILY, Disp: 30 tablet, Rfl: 3  •  lisinopril (PRINIVIL,ZESTRIL) 20 MG tablet, Take 1 tablet by mouth  Daily., Disp: 180 tablet, Rfl: 0  •  metoprolol tartrate (LOPRESSOR) 25 MG tablet, TAKE 1 TABLET BY MOUTH TWICE DAILY, Disp: 180 tablet, Rfl: 1  •  tadalafil (CIALIS) 5 MG tablet, , Disp: , Rfl:   •  zolpidem (AMBIEN) 5 MG tablet, Take 1 tablet by mouth At Night As Needed for Sleep., Disp: 30 tablet, Rfl: 1       Objective   History of Present Illness   Janusz Pfeiffer 66 y.o. male who presents for an Annual Wellness Visit and for a follow up for HTN and HLD.   he has a history of   Patient Active Problem List   Diagnosis   • S/P CABG x 4   • Essential hypertension   • Dyslipidemia   • Coronary arteriosclerosis   • Insomnia   He has been exercising and losing weight. He has noticed that his blood pressure has been low at home, and he has gotten a few readings of 80-90 systolic. He reports he has noticed some intermittent dizziness when he bends over.   He has been compliant with his medication and is not experiencing any side effects. He is followed by cardiology   He has intermittent insomnia and takes ambien infrequently. It was prescribed by his previous PCP and he is requesting a refill. It works well for him.   Labs results discussed in detail with the patient.  Plan to update vaccines if needed today.    Health Habits:  Dental Exam. up to date  Eye Exam. up to date  Exercise: 3 times/week.  Current exercise activities include: bicycling outdoors  Well balanced diet, intermittent fasting   PHQ-9 Depression Screening  Little interest or pleasure in doing things? 0-->not at all   Feeling down, depressed, or hopeless? 0-->not at all   Trouble falling or staying asleep, or sleeping too much?     Feeling tired or having little energy?     Poor appetite or overeating?     Feeling bad about yourself - or that you are a failure or have let yourself or your family down?     Trouble concentrating on things, such as reading the newspaper or watching television?     Moving or speaking so slowly that other people could have  noticed? Or the opposite - being so fidgety or restless that you have been moving around a lot more than usual?     Thoughts that you would be better off dead, or of hurting yourself in some way?     PHQ-9 Total Score 0   If you checked off any problems, how difficult have these problems made it for you to do your work, take care of things at home, or get along with other people?           Lab Results (most recent)     Orders Only on 05/06/2022   Component Date Value Ref Range Status   • WBC 05/19/2022 6.9  3.4 - 10.8 x10E3/uL Final   • RBC 05/19/2022 4.79  4.14 - 5.80 x10E6/uL Final   • Hemoglobin 05/19/2022 15.1  13.0 - 17.7 g/dL Final   • Hematocrit 05/19/2022 44.7  37.5 - 51.0 % Final   • MCV 05/19/2022 93  79 - 97 fL Final   • MCH 05/19/2022 31.5  26.6 - 33.0 pg Final   • MCHC 05/19/2022 33.8  31.5 - 35.7 g/dL Final   • RDW 05/19/2022 11.8  11.6 - 15.4 % Final   • Platelets 05/19/2022 257  150 - 450 x10E3/uL Final   • Neutrophil Rel % 05/19/2022 61  Not Estab. % Final   • Lymphocyte Rel % 05/19/2022 24  Not Estab. % Final   • Monocyte Rel % 05/19/2022 11  Not Estab. % Final   • Eosinophil Rel % 05/19/2022 3  Not Estab. % Final   • Basophil Rel % 05/19/2022 1  Not Estab. % Final   • Neutrophils Absolute 05/19/2022 4.3  1.4 - 7.0 x10E3/uL Final   • Lymphocytes Absolute 05/19/2022 1.6  0.7 - 3.1 x10E3/uL Final   • Monocytes Absolute 05/19/2022 0.7  0.1 - 0.9 x10E3/uL Final   • Eosinophils Absolute 05/19/2022 0.2  0.0 - 0.4 x10E3/uL Final   • Basophils Absolute 05/19/2022 0.1  0.0 - 0.2 x10E3/uL Final   • Immature Granulocyte Rel % 05/19/2022 0  Not Estab. % Final   • Immature Grans Absolute 05/19/2022 0.0  0.0 - 0.1 x10E3/uL Final   • Glucose 05/19/2022 106 (A) 65 - 99 mg/dL Final   • BUN 05/19/2022 22  8 - 27 mg/dL Final   • Creatinine 05/19/2022 1.16  0.76 - 1.27 mg/dL Final   • EGFR Result 05/19/2022 69  >59 mL/min/1.73 Final   • BUN/Creatinine Ratio 05/19/2022 19  10 - 24 Final   • Sodium 05/19/2022 140  134 -  144 mmol/L Final   • Potassium 05/19/2022 4.3  3.5 - 5.2 mmol/L Final   • Chloride 05/19/2022 101  96 - 106 mmol/L Final   • Total CO2 05/19/2022 24  20 - 29 mmol/L Final   • Calcium 05/19/2022 9.1  8.6 - 10.2 mg/dL Final   • Total Protein 05/19/2022 6.4  6.0 - 8.5 g/dL Final   • Albumin 05/19/2022 4.1  3.8 - 4.8 g/dL Final   • Globulin 05/19/2022 2.3  1.5 - 4.5 g/dL Final   • A/G Ratio 05/19/2022 1.8  1.2 - 2.2 Final   • Total Bilirubin 05/19/2022 0.7  0.0 - 1.2 mg/dL Final   • Alkaline Phosphatase 05/19/2022 104  44 - 121 IU/L Final   • AST (SGOT) 05/19/2022 19  0 - 40 IU/L Final   • ALT (SGPT) 05/19/2022 20  0 - 44 IU/L Final   • Hemoglobin A1C 05/19/2022 6.2 (A) 4.8 - 5.6 % Final    Comment:          Prediabetes: 5.7 - 6.4           Diabetes: >6.4           Glycemic control for adults with diabetes: <7.0     • Total Cholesterol 05/19/2022 166  100 - 199 mg/dL Final   • Triglycerides 05/19/2022 94  0 - 149 mg/dL Final   • HDL Cholesterol 05/19/2022 47  >39 mg/dL Final   • VLDL Cholesterol Efrain 05/19/2022 17  5 - 40 mg/dL Final   • LDL Chol Calc (NIH) 05/19/2022 102 (A) 0 - 99 mg/dL Final   • LDL/HDL RATIO 05/19/2022 2.2  0.0 - 3.6 ratio Final    Comment:                                     LDL/HDL Ratio                                              Men  Women                                1/2 Avg.Risk  1.0    1.5                                    Avg.Risk  3.6    3.2                                 2X Avg.Risk  6.2    5.0                                 3X Avg.Risk  8.0    6.1     • TSH 05/19/2022 0.879  0.450 - 4.500 uIU/mL Final   • PSA 05/19/2022 0.3  0.0 - 4.0 ng/mL Final    Comment: Roche ECLIA methodology.  According to the American Urological Association, Serum PSA should  decrease and remain at undetectable levels after radical  prostatectomy. The AUA defines biochemical recurrence as an initial  PSA value 0.2 ng/mL or greater followed by a subsequent confirmatory  PSA value 0.2 ng/mL or greater.  Values  obtained with different assay methods or kits cannot be used  interchangeably. Results cannot be interpreted as absolute evidence  of the presence or absence of malignant disease.     • Specific Gravity, UA 05/19/2022 1.018  1.005 - 1.030 Final   • pH, UA 05/19/2022 6.0  5.0 - 7.5 Final   • Color, UA 05/19/2022 Yellow  Yellow Final   • Appearance, UA 05/19/2022 Clear  Clear Final   • Leukocytes, UA 05/19/2022 Negative  Negative Final   • Protein 05/19/2022 Negative  Negative/Trace Final   • Glucose, UA 05/19/2022 Negative  Negative Final   • Ketones 05/19/2022 Negative  Negative Final   • Blood, UA 05/19/2022 Negative  Negative Final   • Bilirubin, UA 05/19/2022 Negative  Negative Final   • Urobilinogen, UA 05/19/2022 1.0  0.2 - 1.0 mg/dL Final   • Nitrite, UA 05/19/2022 Negative  Negative Final   • Microscopic Examination 05/19/2022 Comment   Final    Microscopic follows if indicated.   • Microscopic Examination 05/19/2022 See below:   Final    Microscopic was indicated and was performed.   • WBC, UA 05/19/2022 None seen  0 - 5 /hpf Final   • RBC, UA 05/19/2022 None seen  0 - 2 /hpf Final   • Epithelial Cells (non renal) 05/19/2022 None seen  0 - 10 /hpf Final   • Casts 05/19/2022 None seen  None seen /lpf Final   • Bacteria, UA 05/19/2022 None seen  None seen/Few Final                 Review of Systems   Constitutional: Negative.    HENT: Positive for hearing loss (right ).    Eyes: Negative for visual disturbance.   Respiratory: Negative for shortness of breath.    Cardiovascular: Positive for chest pain (occasional, followed by cardiology ).   Gastrointestinal: Negative for abdominal pain, constipation and diarrhea.   Genitourinary: Negative.    Neurological: Positive for dizziness.   Psychiatric/Behavioral: Positive for sleep disturbance. Negative for dysphoric mood. The patient is not nervous/anxious.        Physical Exam  Vitals and nursing note reviewed.   Constitutional:       General: He is not in acute  distress.     Appearance: Normal appearance. He is well-developed and well-groomed.   HENT:      Head: Normocephalic.      Right Ear: Tympanic membrane and ear canal normal.      Left Ear: Tympanic membrane and ear canal normal.      Nose: Nose normal.      Mouth/Throat:      Pharynx: Oropharynx is clear.   Eyes:      General: Lids are normal.      Conjunctiva/sclera: Conjunctivae normal.      Comments: Wearing glasses    Neck:      Thyroid: No thyromegaly.   Cardiovascular:      Rate and Rhythm: Normal rate and regular rhythm.      Comments: BP recheck 138/70   Pulmonary:      Effort: Pulmonary effort is normal.      Breath sounds: Normal breath sounds.   Abdominal:      General: Bowel sounds are normal.   Musculoskeletal:      Cervical back: Neck supple.   Skin:     General: Skin is warm and dry.   Neurological:      Mental Status: He is alert and oriented to person, place, and time.   Psychiatric:         Mood and Affect: Mood normal.         ASSESSMENT       Problems Addressed this Visit     Essential hypertension    Relevant Medications    lisinopril (PRINIVIL,ZESTRIL) 20 MG tablet    Dyslipidemia    Insomnia    Relevant Medications    zolpidem (AMBIEN) 5 MG tablet      Other Visit Diagnoses     Annual physical exam    -  Primary    Need for tetanus, diphtheria, and acellular pertussis (Tdap) vaccine in patient of adolescent age or older        Relevant Orders    Tdap Vaccine Greater Than or Equal To 6yo IM (Completed)      Diagnoses       Codes Comments    Annual physical exam    -  Primary ICD-10-CM: Z00.00  ICD-9-CM: V70.0     Insomnia, unspecified type     ICD-10-CM: G47.00  ICD-9-CM: 780.52     Essential hypertension     ICD-10-CM: I10  ICD-9-CM: 401.9     Need for tetanus, diphtheria, and acellular pertussis (Tdap) vaccine in patient of adolescent age or older     ICD-10-CM: Z23  ICD-9-CM: V06.1     Dyslipidemia     ICD-10-CM: E78.5  ICD-9-CM: 272.4           PLAN    He has had some low BP readings at  home, will decrease lisinopril to 20mg daily, for now continue hctz and metoprolol, he will send me some BP readings via [facility] in the next few weeks. Discussed stopping hctz if continued low readings  Insomnia- new rx for ambien asuncion serrano reviewed and is appropriate, controlled substance contract signed  HLD- continue statin  Age and sex appropriate physical exam performed and documented. Updated past medical, family, social and surgical histories as well as allergies and care team list. Addressed care gaps listed in the medical record.   -Encouraged annual dental and vision exams as part of their overall health.   -continue exercise and a well-balanced diet.   -Immunizations reviewed and updated in EMR.      Return in about 6 months (around 11/26/2022) for with labs, Recheck. cmp, lipid

## 2022-06-17 RX ORDER — ATORVASTATIN CALCIUM 40 MG/1
40 TABLET, FILM COATED ORAL DAILY
Qty: 90 TABLET | Refills: 1 | Status: SHIPPED | OUTPATIENT
Start: 2022-06-17 | End: 2022-12-14

## 2022-06-17 RX ORDER — HYDROCHLOROTHIAZIDE 25 MG/1
25 TABLET ORAL DAILY
Qty: 30 TABLET | Refills: 3 | Status: SHIPPED | OUTPATIENT
Start: 2022-06-17 | End: 2022-09-15

## 2022-09-15 RX ORDER — HYDROCHLOROTHIAZIDE 25 MG/1
25 TABLET ORAL DAILY
Qty: 30 TABLET | Refills: 3 | Status: SHIPPED | OUTPATIENT
Start: 2022-09-15 | End: 2022-12-14

## 2022-09-20 RX ORDER — LISINOPRIL 20 MG/1
20 TABLET ORAL DAILY
Qty: 180 TABLET | Refills: 0
Start: 2022-09-20 | End: 2022-09-22 | Stop reason: SDUPTHER

## 2022-09-22 ENCOUNTER — TELEPHONE (OUTPATIENT)
Dept: INTERNAL MEDICINE | Facility: CLINIC | Age: 67
End: 2022-09-22

## 2022-09-22 RX ORDER — LISINOPRIL 20 MG/1
20 TABLET ORAL 2 TIMES DAILY
Qty: 180 TABLET | Refills: 0 | Status: SHIPPED | OUTPATIENT
Start: 2022-09-22 | End: 2023-01-05

## 2022-09-22 RX ORDER — LISINOPRIL 20 MG/1
20 TABLET ORAL DAILY
Qty: 180 TABLET | Refills: 0 | Status: SHIPPED | OUTPATIENT
Start: 2022-09-22 | End: 2022-09-22 | Stop reason: SDUPTHER

## 2022-09-22 NOTE — TELEPHONE ENCOUNTER
----- Message from Janusz Pfeiffer sent at 9/22/2022  4:12 PM EDT -----  Regarding: Lisinopril  I went to the [facility]. they advised there is no prescription for Lisinopril for me still.    Been several days now without it.

## 2022-10-29 ENCOUNTER — APPOINTMENT (OUTPATIENT)
Dept: CT IMAGING | Facility: HOSPITAL | Age: 67
End: 2022-10-29

## 2022-10-29 ENCOUNTER — HOSPITAL ENCOUNTER (EMERGENCY)
Facility: HOSPITAL | Age: 67
Discharge: HOME | End: 2022-10-29
Attending: EMERGENCY MEDICINE | Admitting: EMERGENCY MEDICINE

## 2022-10-29 VITALS
WEIGHT: 203 LBS | DIASTOLIC BLOOD PRESSURE: 72 MMHG | TEMPERATURE: 98 F | SYSTOLIC BLOOD PRESSURE: 124 MMHG | RESPIRATION RATE: 14 BRPM | BODY MASS INDEX: 30.77 KG/M2 | HEART RATE: 60 BPM | OXYGEN SATURATION: 99 % | HEIGHT: 68 IN

## 2022-10-29 DIAGNOSIS — S06.9X9A MINOR HEAD INJURY WITH LOSS OF CONSCIOUSNESS, INITIAL ENCOUNTER: Primary | ICD-10-CM

## 2022-10-29 PROCEDURE — 70450 CT HEAD/BRAIN W/O DYE: CPT

## 2022-10-29 PROCEDURE — 99282 EMERGENCY DEPT VISIT SF MDM: CPT

## 2022-11-02 ENCOUNTER — OFFICE VISIT (OUTPATIENT)
Dept: INTERNAL MEDICINE | Facility: CLINIC | Age: 67
End: 2022-11-02

## 2022-11-02 VITALS
BODY MASS INDEX: 31.52 KG/M2 | RESPIRATION RATE: 16 BRPM | WEIGHT: 208 LBS | SYSTOLIC BLOOD PRESSURE: 122 MMHG | TEMPERATURE: 96.8 F | HEIGHT: 68 IN | OXYGEN SATURATION: 100 % | DIASTOLIC BLOOD PRESSURE: 60 MMHG
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
## 2022-11-02 DIAGNOSIS — S09.90XA MILD CLOSED HEAD INJURY, INITIAL ENCOUNTER: ICD-10-CM

## 2022-11-02 DIAGNOSIS — S81.802A OPEN WOUND OF LEFT LOWER EXTREMITY, INITIAL ENCOUNTER: Primary | ICD-10-CM

## 2022-11-02 PROCEDURE — 99213 OFFICE O/P EST LOW 20 MIN: CPT | Performed by: NURSE PRACTITIONER
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
## 2022-11-07 DIAGNOSIS — E78.5 DYSLIPIDEMIA: ICD-10-CM

## 2022-11-07 DIAGNOSIS — I10 ESSENTIAL HYPERTENSION: Primary | ICD-10-CM

## 2022-11-18 LAB
ALBUMIN SERPL-MCNC: 4.1 G/DL (ref 3.5–5.2)
ALBUMIN/GLOB SERPL: 2.1 G/DL
ALP SERPL-CCNC: 96 U/L (ref 39–117)
ALT SERPL-CCNC: 20 U/L (ref 1–41)
AST SERPL-CCNC: 17 U/L (ref 1–40)
BILIRUB SERPL-MCNC: 0.5 MG/DL (ref 0–1.2)
BUN SERPL-MCNC: 22 MG/DL (ref 8–23)
BUN/CREAT SERPL: 20.6 (ref 7–25)
CALCIUM SERPL-MCNC: 9.3 MG/DL (ref 8.6–10.5)
CHLORIDE SERPL-SCNC: 104 MMOL/L (ref 98–107)
CHOLEST SERPL-MCNC: 161 MG/DL (ref 0–200)
CO2 SERPL-SCNC: 27.4 MMOL/L (ref 22–29)
CREAT SERPL-MCNC: 1.07 MG/DL (ref 0.76–1.27)
EGFRCR SERPLBLD CKD-EPI 2021: 75.1 ML/MIN/1.73
GLOBULIN SER CALC-MCNC: 2 GM/DL
GLUCOSE SERPL-MCNC: 121 MG/DL (ref 65–99)
HDLC SERPL-MCNC: 47 MG/DL (ref 40–60)
LDLC SERPL CALC-MCNC: 93 MG/DL (ref 0–100)
LDLC/HDLC SERPL: 1.92 {RATIO}
POTASSIUM SERPL-SCNC: 4 MMOL/L (ref 3.5–5.2)
PROT SERPL-MCNC: 6.1 G/DL (ref 6–8.5)
SODIUM SERPL-SCNC: 140 MMOL/L (ref 136–145)
TRIGL SERPL-MCNC: 119 MG/DL (ref 0–150)
VLDLC SERPL CALC-MCNC: 21 MG/DL (ref 5–40)

## 2022-11-21 ENCOUNTER — OFFICE VISIT (OUTPATIENT)
Dept: INTERNAL MEDICINE | Facility: CLINIC | Age: 67
End: 2022-11-21

## 2022-11-21 VITALS
WEIGHT: 206 LBS | BODY MASS INDEX: 31.22 KG/M2 | OXYGEN SATURATION: 100 % | HEIGHT: 68 IN | DIASTOLIC BLOOD PRESSURE: 64 MMHG | RESPIRATION RATE: 16 BRPM | SYSTOLIC BLOOD PRESSURE: 116 MMHG

## 2022-11-21 DIAGNOSIS — I10 ESSENTIAL HYPERTENSION: Primary | ICD-10-CM

## 2022-11-21 DIAGNOSIS — S81.802D OPEN WOUND OF LEFT LOWER EXTREMITY, SUBSEQUENT ENCOUNTER: ICD-10-CM

## 2022-11-21 DIAGNOSIS — R73.01 ELEVATED FASTING GLUCOSE: ICD-10-CM

## 2022-11-21 DIAGNOSIS — E78.5 DYSLIPIDEMIA: ICD-10-CM

## 2022-11-21 PROCEDURE — 99213 OFFICE O/P EST LOW 20 MIN: CPT | Performed by: NURSE PRACTITIONER
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
## 2022-12-08 ENCOUNTER — OFFICE VISIT (OUTPATIENT)
Dept: WOUND CARE | Facility: HOSPITAL | Age: 67
End: 2022-12-08

## 2022-12-08 ENCOUNTER — LAB REQUISITION (OUTPATIENT)
Dept: LAB | Facility: HOSPITAL | Age: 67
End: 2022-12-08

## 2022-12-08 DIAGNOSIS — Z00.00 ENCOUNTER FOR GENERAL ADULT MEDICAL EXAMINATION WITHOUT ABNORMAL FINDINGS: ICD-10-CM

## 2022-12-08 PROCEDURE — 87205 SMEAR GRAM STAIN: CPT | Performed by: NURSE PRACTITIONER

## 2022-12-08 PROCEDURE — 87075 CULTR BACTERIA EXCEPT BLOOD: CPT | Performed by: NURSE PRACTITIONER

## 2022-12-08 PROCEDURE — G0463 HOSPITAL OUTPT CLINIC VISIT: HCPCS

## 2022-12-08 PROCEDURE — 97602 WOUND(S) CARE NON-SELECTIVE: CPT

## 2022-12-08 PROCEDURE — 87070 CULTURE OTHR SPECIMN AEROBIC: CPT | Performed by: NURSE PRACTITIONER

## 2022-12-11 LAB
BACTERIA SPEC AEROBE CULT: NORMAL
GRAM STN SPEC: NORMAL
GRAM STN SPEC: NORMAL

## 2022-12-13 LAB — BACTERIA SPEC ANAEROBE CULT: NORMAL

## 2022-12-14 RX ORDER — ATORVASTATIN CALCIUM 40 MG/1
40 TABLET, FILM COATED ORAL DAILY
Qty: 90 TABLET | Refills: 3 | Status: SHIPPED | OUTPATIENT
Start: 2022-12-14 | End: 2023-01-30

## 2022-12-14 RX ORDER — HYDROCHLOROTHIAZIDE 25 MG/1
25 TABLET ORAL DAILY
Qty: 90 TABLET | Refills: 3 | Status: SHIPPED | OUTPATIENT
Start: 2022-12-14

## 2022-12-16 ENCOUNTER — OFFICE VISIT (OUTPATIENT)
Dept: WOUND CARE | Facility: HOSPITAL | Age: 67
End: 2022-12-16

## 2023-01-05 ENCOUNTER — TELEPHONE (OUTPATIENT)
Dept: INTERNAL MEDICINE | Facility: CLINIC | Age: 68
End: 2023-01-05
Payer: COMMERCIAL

## 2023-01-05 RX ORDER — LISINOPRIL 20 MG/1
20 TABLET ORAL 2 TIMES DAILY
Qty: 180 TABLET | Refills: 3 | Status: SHIPPED | OUTPATIENT
Start: 2023-01-05

## 2023-01-05 NOTE — TELEPHONE ENCOUNTER
----- Message from Lorraine Barrett MA sent at 1/5/2023  8:34 AM EST -----  Regarding: FW: Lisinopril Prescription  Contact: [phone]    ----- Message -----  From: Janusz Pfeiffer  Sent: 1/4/2023   3:03 PM EST  To: [facility]  Subject: Lisinopril Prescription                          The most recent prescription I picked up has the directions of 1 pill per day. However, prior to this prescription, it was BID, one morning and evening. Has that been changed? If not, amount of pills in the prescription will not be sufficient.

## 2023-01-06 ENCOUNTER — OFFICE VISIT (OUTPATIENT)
Dept: WOUND CARE | Facility: HOSPITAL | Age: 68
End: 2023-01-06
Payer: COMMERCIAL

## 2023-01-06 PROCEDURE — G0463 HOSPITAL OUTPT CLINIC VISIT: HCPCS

## 2023-01-27 ENCOUNTER — OFFICE VISIT (OUTPATIENT)
Dept: CARDIOLOGY | Facility: CLINIC | Age: 68
End: 2023-01-27
Payer: COMMERCIAL

## 2023-01-27 VITALS
BODY MASS INDEX: 31.32 KG/M2 | HEART RATE: 58 BPM | HEIGHT: 68 IN | DIASTOLIC BLOOD PRESSURE: 78 MMHG | SYSTOLIC BLOOD PRESSURE: 124 MMHG

## 2023-01-27 DIAGNOSIS — I10 ESSENTIAL HYPERTENSION: ICD-10-CM

## 2023-01-27 DIAGNOSIS — Z95.1 S/P CABG X 4: Primary | ICD-10-CM

## 2023-01-27 PROCEDURE — 93000 ELECTROCARDIOGRAM COMPLETE: CPT | Performed by: INTERNAL MEDICINE

## 2023-01-27 PROCEDURE — 99214 OFFICE O/P EST MOD 30 MIN: CPT | Performed by: INTERNAL MEDICINE

## 2023-01-30 RX ORDER — ATORVASTATIN CALCIUM 40 MG/1
80 TABLET, FILM COATED ORAL DAILY
Qty: 90 TABLET | Refills: 3
Start: 2023-01-30

## 2023-01-30 NOTE — PROGRESS NOTES
"      CARDIOLOGY    Phan Mosley MD    ENCOUNTER DATE:  01/27/2023    Janusz Pfieffer / 67 y.o. / male        CHIEF COMPLAINT / REASON FOR OFFICE VISIT     S/P CABG x 4 (01/25/2022 Follow up)  Hypertension    HISTORY OF PRESENT ILLNESS       HPI  Janusz Pfeiffer is a 67 y.o. male who presents today for reevaluation.  Patient has had an EMS and [facility].  Clinically he continues to do well.  He works out 7 days a week with no limitations and he looks great.  Denies chest pain shortness of breath palpitations lightheadedness or swelling.      The following portions of the patient's history were reviewed and updated as appropriate: allergies, current medications, past family history, past medical history, past social history, past surgical history and problem list.      VITAL SIGNS     Visit Vitals  /78 (BP Location: Left arm)   Pulse 58   Ht 172.7 cm (68\")   BMI 31.32 kg/m²         Wt Readings from Last 3 Encounters:   11/21/22 93.4 kg (206 lb)   11/02/22 94.3 kg (208 lb)   10/29/22 92.1 kg (203 lb)     Body mass index is 31.32 kg/m².      REVIEW OF SYSTEMS   ROS        PHYSICAL EXAMINATION     Vitals reviewed.   Constitutional:       Appearance: Healthy appearance.   Pulmonary:      Effort: Pulmonary effort is normal.   Cardiovascular:      Normal rate. Regular rhythm. Normal S1. Normal S2.      Murmurs: There is no murmur.      No gallop. No click. No rub.   Pulses:     Intact distal pulses.   Edema:     Peripheral edema absent.   Neurological:      Mental Status: Alert and oriented to person, place and time.           REVIEWED DATA       ECG 12 Lead    Date/Time: 1/27/2023 8:05 AM  Performed by: Phan Mosley MD  Authorized by: Phan Mosley MD   Comparison: compared with previous ECG from 1/25/2022  Similar to previous ECG  Rhythm: sinus rhythm  Conduction: non-specific intraventricular conduction delay  Other findings: poor R wave progression    Clinical impression: abnormal " EKG            Cardiac Procedures:  1.     Lipid Panel    Lipid Panel 5/19/22 11/17/22   Total Cholesterol 166 161   Triglycerides 94 119   HDL Cholesterol 47 47   VLDL Cholesterol 17 21   LDL Cholesterol  102 (A) 93   LDL/HDL Ratio 2.2 1.92   (A) Abnormal value       Comments are available for some flowsheets but are not being displayed.               ASSESSMENT & PLAN      Diagnosis Plan   1. S/P CABG x 4        2. Essential hypertension              SUMMARY/DISCUSSION  1. History of coronary artery disease.  Clinically is doing great.  His LDL is 102 and would like it below 70.  Clinically he continues to do well.  I increase his Lipitor to 80 mg a day.  His cholesterol rechecked in 12 weeks.  2. Hypertension blood pressures good  3. If he does not reach goal on the increased dose of statin or he has a side effect then we will try Praluent or Repatha.        MEDICATIONS         Discharge Medications          Accurate as of January 27, 2023 11:59 PM. If you have any questions, ask your nurse or doctor.            Continue These Medications      Instructions Start Date   aspirin 81 MG tablet   81 mg, Oral, Daily      atorvastatin 40 MG tablet  Commonly known as: LIPITOR   40 mg, Oral, Daily      CBD oral oil  Commonly known as: cannabidiol   No dose, route, or frequency recorded.      cholecalciferol 25 MCG (1000 UT) tablet  Commonly known as: VITAMIN D3   1,000 Units, Oral, Daily      hydroCHLOROthiazide 25 MG tablet  Commonly known as: HYDRODIURIL   25 mg, Oral, Daily      lisinopril 20 MG tablet  Commonly known as: PRINIVIL,ZESTRIL   20 mg, Oral, 2 Times Daily      metoprolol tartrate 25 MG tablet  Commonly known as: LOPRESSOR   TAKE 1 TABLET BY MOUTH TWICE DAILY      tadalafil 5 MG tablet  Commonly known as: CIALIS   No dose, route, or frequency recorded.      zolpidem 5 MG tablet  Commonly known as: AMBIEN   5 mg, Oral, Nightly PRN                 **Dragon Disclaimer:   Much of this encounter note is an  electronic transcription/translation of spoken language to printed text. The electronic translation of spoken language may permit erroneous, or at times, nonsensical words or phrases to be inadvertently transcribed. Although I have reviewed the note for such errors, some may still exist.

## 2023-02-01 ENCOUNTER — TELEPHONE (OUTPATIENT)
Dept: INTERNAL MEDICINE | Facility: CLINIC | Age: 68
End: 2023-02-01
Payer: COMMERCIAL

## 2023-02-01 NOTE — TELEPHONE ENCOUNTER
----- Message from Marissa Manuel MA/LMR sent at 1/30/2023  5:07 PM EST -----  Regarding: FW: LDL Level: Lipitor Prescription  Contact: [phone]    ----- Message -----  From: Janusz Pfeiffer  Sent: 1/30/2023   2:58 PM EST  To: [facility]  Subject: LDL Level: Lipitor Prescription                  I recently visited my Cardiologist for an annual visit. On the whole he was happy with everything. However, he thinks my LDL level needs to be lower. He suggested initially increasing the Lipitor dosage but told me to touch base with you.

## 2023-03-22 NOTE — TELEPHONE ENCOUNTER
----- Message from Janusz Pfeiffer sent at 5/6/2021 10:05 AM EDT -----  Regarding: Prescription Question  Contact: [phone]  Since switching to 20mg of Lisinopril, taken once a day in the evening, there has been no improvement in my blood pressure. It remains high normal to Grade 1 Hypertension.  Taking my blood pressure in the morning after getting up but before taking the morning dose of metoprolol, the readings have been in the range of 155/90, 151/97 and this morning 165/104. I can also feel, subjectively, when my blood pressure is up. I will add afternoon readings as well for comparison purposes. However, it is clear simply increasing the dose isn't having the desired effect. Might it be time to consider a morning and evening dose to maintain a more consistent level to see if that makes a difference?  
Mychart response sent   
Please call him and let him know I reviewed his blood pressure readings he sent through [employer], I increased the lisinopril to 20mg po twice daily,   I sent a new rx to his pharmacy   Follow up as scheduled     
No lymphadedenopathy

## 2023-04-18 ENCOUNTER — TRANSCRIBE ORDERS (OUTPATIENT)
Dept: ADMINISTRATIVE | Facility: HOSPITAL | Age: 68
End: 2023-04-18
Payer: COMMERCIAL

## 2023-04-18 DIAGNOSIS — Z13.6 ENCOUNTER FOR SCREENING FOR VASCULAR DISEASE: Primary | ICD-10-CM

## 2023-05-19 DIAGNOSIS — Z00.00 ANNUAL PHYSICAL EXAM: Primary | ICD-10-CM

## 2023-05-19 DIAGNOSIS — Z12.5 SCREENING PSA (PROSTATE SPECIFIC ANTIGEN): ICD-10-CM

## 2023-05-19 DIAGNOSIS — R53.83 FATIGUE, UNSPECIFIED TYPE: ICD-10-CM

## 2023-05-25 LAB
ALBUMIN SERPL-MCNC: 3.9 G/DL (ref 3.5–5.2)
ALBUMIN/GLOB SERPL: 2 G/DL
ALP SERPL-CCNC: 89 U/L (ref 39–117)
ALT SERPL-CCNC: 19 U/L (ref 1–41)
AST SERPL-CCNC: 14 U/L (ref 1–40)
BASOPHILS # BLD AUTO: 0.07 10*3/MM3 (ref 0–0.2)
BASOPHILS NFR BLD AUTO: 1 % (ref 0–1.5)
BILIRUB SERPL-MCNC: 0.4 MG/DL (ref 0–1.2)
BUN SERPL-MCNC: 27 MG/DL (ref 8–23)
BUN/CREAT SERPL: 24.1 (ref 7–25)
CALCIUM SERPL-MCNC: 9.3 MG/DL (ref 8.6–10.5)
CHLORIDE SERPL-SCNC: 105 MMOL/L (ref 98–107)
CHOLEST SERPL-MCNC: 140 MG/DL (ref 0–200)
CO2 SERPL-SCNC: 26.4 MMOL/L (ref 22–29)
CREAT SERPL-MCNC: 1.12 MG/DL (ref 0.76–1.27)
EGFRCR SERPLBLD CKD-EPI 2021: 71.1 ML/MIN/1.73
EOSINOPHIL # BLD AUTO: 0.27 10*3/MM3 (ref 0–0.4)
EOSINOPHIL NFR BLD AUTO: 3.9 % (ref 0.3–6.2)
ERYTHROCYTE [DISTWIDTH] IN BLOOD BY AUTOMATED COUNT: 11.8 % (ref 12.3–15.4)
GLOBULIN SER CALC-MCNC: 2 GM/DL
GLUCOSE SERPL-MCNC: 114 MG/DL (ref 65–99)
HCT VFR BLD AUTO: 40.8 % (ref 37.5–51)
HDLC SERPL-MCNC: 39 MG/DL (ref 40–60)
HGB BLD-MCNC: 13.9 G/DL (ref 13–17.7)
IMM GRANULOCYTES # BLD AUTO: 0.02 10*3/MM3 (ref 0–0.05)
IMM GRANULOCYTES NFR BLD AUTO: 0.3 % (ref 0–0.5)
LDLC SERPL CALC-MCNC: 80 MG/DL (ref 0–100)
LDLC/HDLC SERPL: 1.99 {RATIO}
LYMPHOCYTES # BLD AUTO: 1.71 10*3/MM3 (ref 0.7–3.1)
LYMPHOCYTES NFR BLD AUTO: 24.4 % (ref 19.6–45.3)
MCH RBC QN AUTO: 31.8 PG (ref 26.6–33)
MCHC RBC AUTO-ENTMCNC: 34.1 G/DL (ref 31.5–35.7)
MCV RBC AUTO: 93.4 FL (ref 79–97)
MONOCYTES # BLD AUTO: 0.8 10*3/MM3 (ref 0.1–0.9)
MONOCYTES NFR BLD AUTO: 11.4 % (ref 5–12)
NEUTROPHILS # BLD AUTO: 4.14 10*3/MM3 (ref 1.7–7)
NEUTROPHILS NFR BLD AUTO: 59 % (ref 42.7–76)
NRBC BLD AUTO-RTO: 0 /100 WBC (ref 0–0.2)
PLATELET # BLD AUTO: 240 10*3/MM3 (ref 140–450)
POTASSIUM SERPL-SCNC: 4.5 MMOL/L (ref 3.5–5.2)
PROT SERPL-MCNC: 5.9 G/DL (ref 6–8.5)
PSA SERPL-MCNC: 0.3 NG/ML (ref 0–4)
RBC # BLD AUTO: 4.37 10*6/MM3 (ref 4.14–5.8)
SODIUM SERPL-SCNC: 139 MMOL/L (ref 136–145)
TESTOST SERPL-MCNC: 362 NG/DL (ref 264–916)
TRIGL SERPL-MCNC: 116 MG/DL (ref 0–150)
TSH SERPL DL<=0.005 MIU/L-ACNC: 1.15 UIU/ML (ref 0.27–4.2)
VLDLC SERPL CALC-MCNC: 21 MG/DL (ref 5–40)
WBC # BLD AUTO: 7.01 10*3/MM3 (ref 3.4–10.8)

## 2023-05-26 ENCOUNTER — HOSPITAL ENCOUNTER (OUTPATIENT)
Dept: CARDIOLOGY | Facility: HOSPITAL | Age: 68
Discharge: HOME | End: 2023-05-26

## 2023-05-26 VITALS
WEIGHT: 198 LBS | HEIGHT: 68 IN | BODY MASS INDEX: 30.01 KG/M2 | DIASTOLIC BLOOD PRESSURE: 76 MMHG | HEART RATE: 54 BPM | SYSTOLIC BLOOD PRESSURE: 118 MMHG

## 2023-05-26 DIAGNOSIS — Z13.6 ENCOUNTER FOR SCREENING FOR VASCULAR DISEASE: ICD-10-CM

## 2023-05-26 LAB
BH CV ECHO MEAS - DIST AO DIAM: 1.52 CM
BH CV VAS BP LEFT ARM: NORMAL MMHG
BH CV VAS BP RIGHT ARM: NORMAL MMHG
BH CV XLRA MEAS - MID AO DIAM: 1.71 CM
BH CV XLRA MEAS - PAD LEFT ABI DP: 1.2
BH CV XLRA MEAS - PAD LEFT ABI PT: 1.27
BH CV XLRA MEAS - PAD LEFT ARM: 115 MMHG
BH CV XLRA MEAS - PAD LEFT LEG DP: 142 MMHG
BH CV XLRA MEAS - PAD LEFT LEG PT: 150 MMHG
BH CV XLRA MEAS - PAD RIGHT ABI DP: 1.21
BH CV XLRA MEAS - PAD RIGHT ABI PT: 1.22
BH CV XLRA MEAS - PAD RIGHT ARM: 118 MMHG
BH CV XLRA MEAS - PAD RIGHT LEG DP: 143 MMHG
BH CV XLRA MEAS - PAD RIGHT LEG PT: 144 MMHG
BH CV XLRA MEAS - PROX AO DIAM: 1.84 CM
BH CV XLRA MEAS LEFT ICA/CCA RATIO: 0.92
BH CV XLRA MEAS LEFT MID CCA PSV: NORMAL CM/SEC
BH CV XLRA MEAS LEFT MID ICA PSV: NORMAL CM/SEC
BH CV XLRA MEAS LEFT PROX ECA PSV: NORMAL CM/SEC
BH CV XLRA MEAS RIGHT ICA/CCA RATIO: 1.14
BH CV XLRA MEAS RIGHT MID CCA PSV: NORMAL CM/SEC
BH CV XLRA MEAS RIGHT MID ICA PSV: NORMAL CM/SEC
BH CV XLRA MEAS RIGHT PROX ECA PSV: NORMAL CM/SEC
MAXIMAL PREDICTED HEART RATE: 151 BPM
STRESS TARGET HR: 128 BPM

## 2023-05-26 PROCEDURE — 93799 UNLISTED CV SVC/PROCEDURE: CPT

## 2023-05-31 ENCOUNTER — OFFICE VISIT (OUTPATIENT)
Dept: INTERNAL MEDICINE | Facility: CLINIC | Age: 68
End: 2023-05-31

## 2023-05-31 ENCOUNTER — TELEPHONE (OUTPATIENT)
Dept: INTERNAL MEDICINE | Facility: CLINIC | Age: 68
End: 2023-05-31

## 2023-05-31 VITALS
SYSTOLIC BLOOD PRESSURE: 120 MMHG | OXYGEN SATURATION: 96 % | HEART RATE: 63 BPM | HEIGHT: 68 IN | BODY MASS INDEX: 30.77 KG/M2 | WEIGHT: 203 LBS | DIASTOLIC BLOOD PRESSURE: 78 MMHG

## 2023-05-31 DIAGNOSIS — Z23 NEED FOR PNEUMOCOCCAL VACCINE: ICD-10-CM

## 2023-05-31 DIAGNOSIS — Z00.00 ANNUAL PHYSICAL EXAM: Primary | ICD-10-CM

## 2023-05-31 PROBLEM — R73.03 PREDIABETES: Status: ACTIVE | Noted: 2023-05-31

## 2023-05-31 LAB
HBA1C MFR BLD: 6.2 % (ref 4.8–5.6)
HEMOCCULT STL QL IA: POSITIVE
WRITTEN AUTHORIZATION: NORMAL

## 2023-05-31 RX ORDER — CETIRIZINE HYDROCHLORIDE 10 MG/1
TABLET ORAL
COMMUNITY
Start: 2023-04-01

## 2023-05-31 RX ORDER — FLUOROURACIL 50 MG/G
CREAM TOPICAL
COMMUNITY
Start: 2023-05-30

## 2023-05-31 NOTE — PROGRESS NOTES
Subjective   Janusz Pfeiffer is a 67 y.o. male. Patient is here today for   Chief Complaint   Patient presents with   • Annual Exam          Vitals:    05/31/23 0744   BP: 120/78   Pulse: 63   SpO2: 96%     Body mass index is 31.33 kg/m².    The following portions of the patient's history were reviewed and updated as appropriate: allergies, current medications, past family history, past medical history, past social history, past surgical history and problem list.    Past Medical History:   Diagnosis Date   • Allergic    • Benign prostatic hyperplasia    • CAD (coronary artery disease)    • History of transfusion    • Hypocholesteremia    • Ischemic heart disease    • Keratosis    • Myocardial disease    • Myocardial infarction    • PAC (premature atrial contraction)    • PVC (premature ventricular contraction)    • Skin cancer of face       Allergies   Allergen Reactions   • Morphine And Related Hives     soa      Social History     Socioeconomic History   • Marital status:    Tobacco Use   • Smoking status: Never   • Smokeless tobacco: Never   • Tobacco comments:     no caffeine    Vaping Use   • Vaping Use: Never used   Substance and Sexual Activity   • Alcohol use: Yes     Alcohol/week: 4.0 standard drinks     Types: 4 Glasses of wine per week     Comment: social   • Drug use: No   • Sexual activity: Yes     Partners: Female        Current Outpatient Medications:   •  aspirin 81 MG tablet, Take 1 tablet by mouth Daily., Disp: , Rfl:   •  atorvastatin (LIPITOR) 40 MG tablet, Take 2 tablets by mouth Daily., Disp: 90 tablet, Rfl: 3  •  CBD (cannabidiol) oral oil, , Disp: , Rfl:   •  cetirizine (ZyrTEC Allergy) 10 MG tablet, , Disp: , Rfl:   •  Cholecalciferol (VITAMIN D) 1000 units tablet, Take 1 tablet by mouth Daily., Disp: , Rfl:   •  hydroCHLOROthiazide (HYDRODIURIL) 25 MG tablet, TAKE 1 TABLET BY MOUTH DAILY, Disp: 90 tablet, Rfl: 3  •  lisinopril (PRINIVIL,ZESTRIL) 20 MG tablet, Take 1 tablet by mouth  2 (Two) Times a Day., Disp: 180 tablet, Rfl: 3  •  metoprolol tartrate (LOPRESSOR) 25 MG tablet, TAKE 1 TABLET BY MOUTH TWICE DAILY, Disp: 180 tablet, Rfl: 1  •  tadalafil (CIALIS) 5 MG tablet, , Disp: , Rfl:   •  zolpidem (AMBIEN) 5 MG tablet, Take 1 tablet by mouth At Night As Needed for Sleep., Disp: 30 tablet, Rfl: 1  •  fluorouracil (EFUDEX) 5 % cream, , Disp: , Rfl:        Objective   History of Present Illness   Janusz Pfeiffer 67 y.o. male who presents for an Annual physical exam.   Lab results discussed in detail with the patient.  Plan to update vaccines if needed today. 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 BP is well controlled at home . He is compliant with his medication and is not experiencing any side effects.     Health Habits:  Dental Exam. up to date  Eye Exam. up to date  Exercise: 7 times/week.  Current exercise activities include: bicycling outdoors, walking and weightlifting    Preventative counseling  Discussed face to face the importance of healthy diet and exercise.     Lab Results (most recent)     Office Visit on 05/31/2023   Component Date Value Ref Range Status   • POC OCCULT BLOOD BY IMMUNOASSAY 05/31/2023 Positive (A)  Negative Final   Hospital Outpatient Visit on 05/26/2023   Component Date Value Ref Range Status   • Target HR (85%) 05/26/2023 128  bpm Final   • Max. Pred. HR (100%) 05/26/2023 151  bpm Final   • BH CV VAS BP RIGHT ARM 05/26/2023 118/76  mmHg Final   • BH CV VAS BP LEFT ARM 05/26/2023 115/73  mmHg Final   • Right Mid CCA PSV 05/26/2023 42/11  cm/sec Final   • left Mid CCA PSV 05/26/2023 56/14  cm/sec Final   • Mid ICA PSV 05/26/2023 48/14  cm/sec Final   • Mid ICA PSV 05/26/2023 52/22  cm/sec Final   • Prox ECA PSV 05/26/2023 60/9  cm/sec Final   • Prox ECA PSV 05/26/2023 63/9  cm/sec Final   • ICA/CCA ratio 05/26/2023 1.14   Final   • ICA/CCA ratio 05/26/2023 0.92   Final   • Prox Ao Diam 05/26/2023 1.84  cm Final   • Mid Ao Diam 05/26/2023 1.71  cm  Final   • Dist Ao Diam 05/26/2023 1.52  cm Final   • PAD Right Arm 05/26/2023 118  mmHg Final   • PAD Left Arm 05/26/2023 115  mmHg Final   • PAD Right Leg PT 05/26/2023 144  mmHg Final   • PAD Left Leg PT 05/26/2023 150  mmHg Final   • PAD Right Leg DP 05/26/2023 143  mmHg Final   • PAD Left Leg DP 05/26/2023 142  mmHg Final   • PAD Right MANDI PT 05/26/2023 1.22   Final   • PAD Left MANDI PT 05/26/2023 1.27   Final   • PAD Right MANDI DP 05/26/2023 1.21   Final   • PAD Left MANDI DP 05/26/2023 1.20   Final   Orders Only on 05/19/2023   Component Date Value Ref Range Status   • WBC 05/24/2023 7.01  3.40 - 10.80 10*3/mm3 Final   • RBC 05/24/2023 4.37  4.14 - 5.80 10*6/mm3 Final   • Hemoglobin 05/24/2023 13.9  13.0 - 17.7 g/dL Final   • Hematocrit 05/24/2023 40.8  37.5 - 51.0 % Final   • MCV 05/24/2023 93.4  79.0 - 97.0 fL Final   • MCH 05/24/2023 31.8  26.6 - 33.0 pg Final   • MCHC 05/24/2023 34.1  31.5 - 35.7 g/dL Final   • RDW 05/24/2023 11.8 (L)  12.3 - 15.4 % Final   • Platelets 05/24/2023 240  140 - 450 10*3/mm3 Final   • Neutrophil Rel % 05/24/2023 59.0  42.7 - 76.0 % Final   • Lymphocyte Rel % 05/24/2023 24.4  19.6 - 45.3 % Final   • Monocyte Rel % 05/24/2023 11.4  5.0 - 12.0 % Final   • Eosinophil Rel % 05/24/2023 3.9  0.3 - 6.2 % Final   • Basophil Rel % 05/24/2023 1.0  0.0 - 1.5 % Final   • Neutrophils Absolute 05/24/2023 4.14  1.70 - 7.00 10*3/mm3 Final   • Lymphocytes Absolute 05/24/2023 1.71  0.70 - 3.10 10*3/mm3 Final   • Monocytes Absolute 05/24/2023 0.80  0.10 - 0.90 10*3/mm3 Final   • Eosinophils Absolute 05/24/2023 0.27  0.00 - 0.40 10*3/mm3 Final   • Basophils Absolute 05/24/2023 0.07  0.00 - 0.20 10*3/mm3 Final   • Immature Granulocyte Rel % 05/24/2023 0.3  0.0 - 0.5 % Final   • Immature Grans Absolute 05/24/2023 0.02  0.00 - 0.05 10*3/mm3 Final   • nRBC 05/24/2023 0.0  0.0 - 0.2 /100 WBC Final   • Glucose 05/24/2023 114 (H)  65 - 99 mg/dL Final   • BUN 05/24/2023 27 (H)  8 - 23 mg/dL Final   •  Creatinine 05/24/2023 1.12  0.76 - 1.27 mg/dL Final   • EGFR Result 05/24/2023 71.1  >60.0 mL/min/1.73 Final    Comment: GFR Normal >60  Chronic Kidney Disease <60  Kidney Failure <15     • BUN/Creatinine Ratio 05/24/2023 24.1  7.0 - 25.0 Final   • Sodium 05/24/2023 139  136 - 145 mmol/L Final   • Potassium 05/24/2023 4.5  3.5 - 5.2 mmol/L Final   • Chloride 05/24/2023 105  98 - 107 mmol/L Final   • Total CO2 05/24/2023 26.4  22.0 - 29.0 mmol/L Final   • Calcium 05/24/2023 9.3  8.6 - 10.5 mg/dL Final   • Total Protein 05/24/2023 5.9 (L)  6.0 - 8.5 g/dL Final   • Albumin 05/24/2023 3.9  3.5 - 5.2 g/dL Final   • Globulin 05/24/2023 2.0  gm/dL Final   • A/G Ratio 05/24/2023 2.0  g/dL Final   • Total Bilirubin 05/24/2023 0.4  0.0 - 1.2 mg/dL Final   • Alkaline Phosphatase 05/24/2023 89  39 - 117 U/L Final   • AST (SGOT) 05/24/2023 14  1 - 40 U/L Final   • ALT (SGPT) 05/24/2023 19  1 - 41 U/L Final   • Total Cholesterol 05/24/2023 140  0 - 200 mg/dL Final    Comment: Cholesterol Reference Ranges  (U.S. Department of Health and Human Services ATP III  Classifications)  Desirable          <200 mg/dL  Borderline High    200-239 mg/dL  High Risk          >240 mg/dL  Triglyceride Reference Ranges  (U.S. Department of Health and Human Services ATP III  Classifications)  Normal           <150 mg/dL  Borderline High  150-199 mg/dL  High             200-499 mg/dL  Very High        >500 mg/dL  HDL Reference Ranges  (U.S. Department of Health and Human Services ATP III  Classifications)  Low     <40 mg/dl (major risk factor for CHD)  High    >60 mg/dl ('negative' risk factor for CHD)  LDL Reference Ranges  (U.S. Department of Health and Human Services ATP III  Classifications)  Optimal          <100 mg/dL  Near Optimal     100-129 mg/dL  Borderline High  130-159 mg/dL  High             160-189 mg/dL  Very High        >189 mg/dL     • Triglycerides 05/24/2023 116  0 - 150 mg/dL Final   • HDL Cholesterol 05/24/2023 39 (L)  40 - 60  mg/dL Final   • VLDL Cholesterol Efrain 05/24/2023 21  5 - 40 mg/dL Final   • LDL Chol Calc ([facility]) 05/24/2023 80  0 - 100 mg/dL Final   • LDL/HDL RATIO 05/24/2023 1.99   Final   • TSH 05/24/2023 1.150  0.270 - 4.200 uIU/mL Final   • PSA 05/24/2023 0.297  0.000 - 4.000 ng/mL Final    Comment: Testing Method: Roche [employer] Electrochemiluminescence  Immunoassay(ECLIA)  Values obtained with different assay methods or kits cannot  be used interchangeably.     • Testosterone, Total 05/24/2023 362  264 - 916 ng/dL Final    Comment: Adult male reference interval is based on a population of  healthy nonobese males (BMI <30) between 19 and 39 years old.  melissa Hirsch.al. JCEM 2017,102;0414-8386. PMID: 71170513.               ACP discussion was held with the patient during this visit. Patient has an advance directive (not in EMR), copy requested.      Review of Systems   Constitutional: Positive for fatigue (occasional ).   Eyes: Negative for visual disturbance.   Respiratory: Positive for shortness of breath (occasional ).    Cardiovascular: Positive for chest pain (occasional , not exertional , has done 12 lead ekg at work ).   Gastrointestinal: Negative for anal bleeding, blood in stool, constipation and diarrhea.   Genitourinary: Negative for difficulty urinating.   Allergic/Immunologic: Positive for environmental allergies.   Psychiatric/Behavioral: Negative for dysphoric mood and sleep disturbance. The patient is not nervous/anxious.        Physical Exam  Vitals and nursing note reviewed.   Constitutional:       General: He is not in acute distress.     Appearance: Normal appearance. He is well-developed and well-groomed.   HENT:      Head: Normocephalic.      Right Ear: Tympanic membrane and ear canal normal.      Left Ear: Tympanic membrane and ear canal normal.      Nose: Rhinorrhea present.      Mouth/Throat:      Pharynx: Oropharynx is clear.   Eyes:      General: Lids are normal.      Conjunctiva/sclera:  Conjunctivae normal.   Neck:      Thyroid: No thyromegaly.   Cardiovascular:      Rate and Rhythm: Normal rate and regular rhythm.      Heart sounds: Normal heart sounds.   Pulmonary:      Effort: Pulmonary effort is normal.      Breath sounds: Normal breath sounds.   Abdominal:      General: Bowel sounds are normal.      Palpations: Abdomen is soft.      Hernia: There is no hernia in the left inguinal area or right inguinal area.   Genitourinary:     Penis: Normal and circumcised.       Testes: Normal.      Prostate: Normal.      Rectum: Guaiac result positive. No tenderness, external hemorrhoid or internal hemorrhoid.   Musculoskeletal:      Cervical back: Neck supple.   Skin:     General: Skin is warm and dry.   Neurological:      Mental Status: He is alert and oriented to person, place, and time.   Psychiatric:         Mood and Affect: Mood normal.         ASSESSMENT       Problems Addressed this Visit    None  Visit Diagnoses     Annual physical exam    -  Primary    Relevant Orders    POC FECAL OCCULT BLOOD BY IMMUNOASSAY    Need for pneumococcal vaccine        Relevant Orders    Pneumococcal Conjugate Vaccine 20-Valent (PCV20)      Diagnoses       Codes Comments    Annual physical exam    -  Primary ICD-10-CM: Z00.00  ICD-9-CM: V70.0     Need for pneumococcal vaccine     ICD-10-CM: Z23  ICD-9-CM: V03.82           PLAN    BP is well controlled  Continue statin   Fasting glucose is elevated but improved. Will try to add an a1c on to labs drawn   Follow up with cardiology as scheduled  FIOBT is positive, will have patient repeat x 2, and if positive will refer to GI  prevnar 20 today  Discussed covid booster  Age and sex appropriate physical exam performed and documented. Updated past medical, family, social and surgical histories as well as allergies and care team list. Addressed care gaps listed in the medical record.   seat belt use for every car ride for patient and all occupants.    sunscreen use to reduce  risk of skin cancer for any days with sun exposure over 20 minutes.    helmet if biking or riding motorcycle to prevent head trauma.   -Encouraged annual dental and vision exams as part of their overall health.   -Encouraged 15- 30 minutes or more of exercise at a brisk walk or higher or an activity you enjoy 3-5 days per week combined with a well-balanced diet.   -Immunizations reviewed and updated in EMR.       Return in about 1 year (around 5/31/2024) for Annual physical, with labs including a1c .  Patient was given instructions and counseling regarding his  condition for health maintenance advice.  Please see specific information pulled into the AVS if appropriate.

## 2023-05-31 NOTE — TELEPHONE ENCOUNTER
----- Message from ZULEIKA Quinones sent at 5/31/2023  8:50 AM EDT -----  Please call Mr Castro and let him know that FIOBT test is positive, he will need to repeat x 2, if both are positive will refer to GI. Please have him  in office at desk

## 2023-08-28 ENCOUNTER — TELEPHONE (OUTPATIENT)
Dept: INTERNAL MEDICINE | Facility: CLINIC | Age: 68
End: 2023-08-28
Payer: COMMERCIAL

## 2023-08-28 RX ORDER — LISINOPRIL 20 MG/1
40 TABLET ORAL DAILY
Qty: 180 TABLET | Refills: 3 | Status: SHIPPED | OUTPATIENT
Start: 2023-08-28

## 2023-08-28 RX ORDER — LISINOPRIL 20 MG/1
40 TABLET ORAL DAILY
Qty: 180 TABLET | Refills: 3 | Status: SHIPPED | OUTPATIENT
Start: 2023-08-28 | End: 2023-08-28 | Stop reason: SDUPTHER

## 2023-08-28 NOTE — TELEPHONE ENCOUNTER
----- Message from Janusz Pfeiffer sent at 8/28/2023  2:44 PM EDT -----  Regarding: Lisonpril  Contact: [phone]  Thank you. Did you get the pharmacy change, from [facility] to   [facility] pharmacy is [facility], [address] [place] [address], [facility], [phone]

## 2023-09-21 ENCOUNTER — TRANSCRIBE ORDERS (OUTPATIENT)
Dept: CARDIOLOGY | Facility: CLINIC | Age: 68
End: 2023-09-21

## 2023-09-21 DIAGNOSIS — Z13.9 SCREENING PROCEDURE: Primary | ICD-10-CM

## 2023-10-05 ENCOUNTER — HOSPITAL ENCOUNTER (OUTPATIENT)
Facility: HOSPITAL | Age: 68
Discharge: HOME | End: 2023-10-05
Payer: COMMERCIAL

## 2023-10-05 ENCOUNTER — TRANSCRIBE ORDERS (OUTPATIENT)
Facility: HOSPITAL | Age: 68
End: 2023-10-05
Payer: COMMERCIAL

## 2023-10-05 DIAGNOSIS — Z00.00 ANNUAL PHYSICAL EXAM: Primary | ICD-10-CM

## 2023-10-05 DIAGNOSIS — Z00.00 ANNUAL PHYSICAL EXAM: ICD-10-CM

## 2023-10-05 PROCEDURE — 71046 X-RAY EXAM CHEST 2 VIEWS: CPT

## 2023-10-10 ENCOUNTER — HOSPITAL ENCOUNTER (OUTPATIENT)
Dept: CARDIOLOGY | Facility: HOSPITAL | Age: 68
Discharge: HOME | End: 2023-10-10

## 2023-10-10 PROCEDURE — 93017 CV STRESS TEST TRACING ONLY: CPT

## 2023-10-12 LAB
BH CV STRESS BP STAGE 1: NORMAL
BH CV STRESS BP STAGE 2: NORMAL
BH CV STRESS BP STAGE 3: NORMAL
BH CV STRESS DURATION MIN STAGE 1: 3
BH CV STRESS DURATION MIN STAGE 2: 3
BH CV STRESS DURATION MIN STAGE 3: 1
BH CV STRESS DURATION SEC STAGE 1: 0
BH CV STRESS DURATION SEC STAGE 2: 0
BH CV STRESS DURATION SEC STAGE 3: 0
BH CV STRESS GRADE STAGE 1: 10
BH CV STRESS GRADE STAGE 2: 12
BH CV STRESS GRADE STAGE 3: 14
BH CV STRESS HR STAGE 1: 135
BH CV STRESS HR STAGE 2: 146
BH CV STRESS HR STAGE 3: 154
BH CV STRESS METS STAGE 1: 5
BH CV STRESS METS STAGE 2: 7.5
BH CV STRESS METS STAGE 3: 10
BH CV STRESS PROTOCOL 1: NORMAL
BH CV STRESS RECOVERY BP: NORMAL MMHG
BH CV STRESS RECOVERY HR: 100 BPM
BH CV STRESS SPEED STAGE 1: 1.7
BH CV STRESS SPEED STAGE 2: 2.5
BH CV STRESS SPEED STAGE 3: 3.4
BH CV STRESS STAGE 1: 1
BH CV STRESS STAGE 2: 2
BH CV STRESS STAGE 3: 3
MAXIMAL PREDICTED HEART RATE: 150 BPM
PERCENT MAX PREDICTED HR: 104 %
STRESS BASELINE BP: NORMAL MMHG
STRESS BASELINE HR: 88 BPM
STRESS PERCENT HR: 122 %
STRESS POST ESTIMATED WORKLOAD: 8.6 METS
STRESS POST EXERCISE DUR MIN: 7 MIN
STRESS POST PEAK BP: NORMAL MMHG
STRESS POST PEAK HR: 156 BPM
STRESS TARGET HR: 128 BPM

## 2023-11-20 RX ORDER — HYDROCHLOROTHIAZIDE 25 MG/1
25 TABLET ORAL DAILY
Qty: 90 TABLET | Refills: 3 | Status: SHIPPED | OUTPATIENT
Start: 2023-11-20

## 2023-12-19 NOTE — PROGRESS NOTES
Subjective   Janusz Pfeiffer is a 68 y.o. male. Patient is here today for   Chief Complaint   Patient presents with    Hypertension    Medication Problem   Answers submitted by the patient for this visit:  Primary Reason for Visit (Submitted on 12/19/2023)  What is the primary reason for your visit?: Other  Other (Submitted on 12/19/2023)  Please describe your symptoms.: Need to renew multiple prescriptions: Constant fatigue, loss of muscle strength and mass, less stamina during workouts  Have you had these symptoms before?: No  How long have you been having these symptoms?: Greater than 2 weeks  Please list any medications you are currently taking for this condition.: None  Please describe any probable cause for these symptoms. : Suspect low testosterone-while not below the clinical threshold, certainly low enough to be noticable.         Vitals:    12/21/23 0824   BP: 106/60   Pulse: 68   Resp: 16   SpO2: 98%     Body mass index is 31.17 kg/m².  The following portions of the patient's history were reviewed and updated as appropriate: allergies, current medications, past family history, past medical history, past social history, past surgical history and problem list.    Past Medical History:   Diagnosis Date    Allergic     Benign prostatic hyperplasia     CAD (coronary artery disease)     History of transfusion     Hypocholesteremia     Ischemic heart disease     Keratosis     Myocardial disease     Myocardial infarction     PAC (premature atrial contraction)     PVC (premature ventricular contraction)     Skin cancer of face       Allergies   Allergen Reactions    Morphine And Related Hives     soa      Social History     Socioeconomic History    Marital status:    Tobacco Use    Smoking status: Never    Smokeless tobacco: Never    Tobacco comments:     no caffeine    Vaping Use    Vaping Use: Never used   Substance and Sexual Activity    Alcohol use: Yes     Alcohol/week: 2.0 standard drinks of alcohol      Types: 2 Glasses of wine per week     Comment: social    Drug use: No    Sexual activity: Yes     Partners: Female     Birth control/protection: Post-menopausal        Current Outpatient Medications:     aspirin 81 MG tablet, Take 1 tablet by mouth Daily., Disp: , Rfl:     atorvastatin (LIPITOR) 40 MG tablet, Take 2 tablets by mouth Daily., Disp: 90 tablet, Rfl: 3    CBD (cannabidiol) oral oil, , Disp: , Rfl:     cetirizine (ZyrTEC Allergy) 10 MG tablet, , Disp: , Rfl:     Cholecalciferol (VITAMIN D) 1000 units tablet, Take 1 tablet by mouth Daily., Disp: , Rfl:     fluorouracil (EFUDEX) 5 % cream, , Disp: , Rfl:     hydroCHLOROthiazide (HYDRODIURIL) 25 MG tablet, TAKE 1 TABLET BY MOUTH EVERY DAY, Disp: 90 tablet, Rfl: 3    lisinopril (PRINIVIL,ZESTRIL) 20 MG tablet, Take 2 tablets by mouth Daily., Disp: 180 tablet, Rfl: 3    metoprolol tartrate (LOPRESSOR) 25 MG tablet, TAKE 1 TABLET BY MOUTH TWICE DAILY, Disp: 180 tablet, Rfl: 1    tadalafil (CIALIS) 5 MG tablet, , Disp: , Rfl:     zolpidem (AMBIEN) 5 MG tablet, Take 1 tablet by mouth At Night As Needed for Sleep., Disp: 30 tablet, Rfl: 2     Objective     History of Present Illness  Janusz is a 68 year old male patient who is here for a follow up for hypertension and insomnia. He has been monitoring his blood pressure at home and he c/o light headedness and dizziness when systolic BP drops below 100. He is on lisinopril hctz, metoprolol. He denies syncope, chest pain, and shortness of breath  He has had some fatigue and would like to discuss testosterone.   He has insomnia and takes ambien infrequently as needed. He is requesting a refill     Review of Systems   Constitutional:  Positive for fatigue.   Respiratory: Negative.     Cardiovascular: Negative.    Gastrointestinal: Negative.    Neurological:  Positive for light-headedness. Negative for syncope.   Psychiatric/Behavioral:  Positive for sleep disturbance.        Physical Exam  Vitals and nursing note  reviewed.   Constitutional:       General: He is not in acute distress.     Appearance: Normal appearance. He is well-developed and well-groomed.   Neurological:      Mental Status: He is alert.         Assessment    ASSESSMENT    Problems Addressed this Visit       Essential hypertension    Insomnia    Relevant Medications    zolpidem (AMBIEN) 5 MG tablet     Other Visit Diagnoses       Fatigue, unspecified type    -  Primary    Relevant Orders    Testosterone    High risk medication use        Relevant Medications    zolpidem (AMBIEN) 5 MG tablet    Other Relevant Orders    [id number] 8+Oxycodone+Crt-Unbund - Urine, Clean Catch          Diagnoses         Codes Comments    Fatigue, unspecified type    -  Primary ICD-10-CM: R53.83  ICD-9-CM: 780.79     Insomnia, unspecified type     ICD-10-CM: G47.00  ICD-9-CM: 780.52     High risk medication use     ICD-10-CM: Z79.899  ICD-9-CM: V58.69     Essential hypertension     ICD-10-CM: I10  ICD-9-CM: 401.9             PLAN  HTN- BP is slightly low today. He is asymptomatic. He will continue to monitor his blood pressure at home and will discuss decreasing lisinopril if he continues to have low readings .   Fatigue- last testosterone was on the low end of normal . Will recheck today. Discussed referral to urology for tx if needed  Insomnia- will refill ambien. Last fill was in may 2022. PDMD reviewed and is appropriate. UDS obtained, and contract updated   Return for Next scheduled follow up.

## 2023-12-21 ENCOUNTER — OFFICE VISIT (OUTPATIENT)
Dept: INTERNAL MEDICINE | Facility: CLINIC | Age: 68
End: 2023-12-21
Payer: COMMERCIAL

## 2023-12-21 VITALS
DIASTOLIC BLOOD PRESSURE: 60 MMHG | RESPIRATION RATE: 16 BRPM | WEIGHT: 202 LBS | HEIGHT: 68 IN | BODY MASS INDEX: 30.62 KG/M2 | SYSTOLIC BLOOD PRESSURE: 106 MMHG | OXYGEN SATURATION: 98 % | HEART RATE: 68 BPM

## 2023-12-21 DIAGNOSIS — I10 ESSENTIAL HYPERTENSION: ICD-10-CM

## 2023-12-21 DIAGNOSIS — R53.83 FATIGUE, UNSPECIFIED TYPE: Primary | ICD-10-CM

## 2023-12-21 DIAGNOSIS — G47.00 INSOMNIA, UNSPECIFIED TYPE: ICD-10-CM

## 2023-12-21 DIAGNOSIS — Z79.899 HIGH RISK MEDICATION USE: ICD-10-CM

## 2023-12-21 PROCEDURE — 99214 OFFICE O/P EST MOD 30 MIN: CPT | Performed by: NURSE PRACTITIONER

## 2023-12-21 RX ORDER — ZOLPIDEM TARTRATE 5 MG/1
5 TABLET ORAL NIGHTLY PRN
Qty: 30 TABLET | Refills: 2 | Status: SHIPPED | OUTPATIENT
Start: 2023-12-21

## 2023-12-22 LAB
ACCEPTABLE CREAT UR QL: 229.5 MG/DL (ref 20–300)
AMPHETAMINES UR QL SCN: NEGATIVE NG/ML
BARBITURATES UR QL SCN: NEGATIVE NG/ML
BENZODIAZ UR QL SCN: NEGATIVE NG/ML
COCAINE UR QL SCN: NEGATIVE NG/ML
METHADONE UR QL SCN: NEGATIVE NG/ML
OPIATES UR QL SCN: NEGATIVE NG/ML
OXYCODONE+OXYMORPHONE UR QL SCN: NEGATIVE NG/ML
PCP UR QL SCN: NEGATIVE NG/ML
PH UR: 6.2 [PH] (ref 4.5–8.9)
PROPOXYPH UR QL SCN: NEGATIVE NG/ML
TESTOST SERPL-MCNC: 486 NG/DL (ref 264–916)

## 2023-12-28 ENCOUNTER — TELEPHONE (OUTPATIENT)
Dept: INTERNAL MEDICINE | Facility: CLINIC | Age: 68
End: 2023-12-28
Payer: COMMERCIAL

## 2023-12-28 NOTE — TELEPHONE ENCOUNTER
----- Message from ZULEIKA Quinones sent at 12/28/2023 12:24 PM EST -----  Please notify patient that testosterone level is normal and is higher than it was last time we checked it  UDS is normal

## 2024-01-08 RX ORDER — ATORVASTATIN CALCIUM 40 MG/1
40 TABLET, FILM COATED ORAL DAILY
Qty: 90 TABLET | Refills: 3 | Status: SHIPPED | OUTPATIENT
Start: 2024-01-08

## 2024-01-29 ENCOUNTER — OFFICE VISIT (OUTPATIENT)
Dept: CARDIOLOGY | Facility: CLINIC | Age: 69
End: 2024-01-29
Payer: COMMERCIAL

## 2024-01-29 VITALS
WEIGHT: 206 LBS | HEART RATE: 59 BPM | SYSTOLIC BLOOD PRESSURE: 118 MMHG | BODY MASS INDEX: 31.22 KG/M2 | DIASTOLIC BLOOD PRESSURE: 68 MMHG | HEIGHT: 68 IN

## 2024-01-29 DIAGNOSIS — I10 ESSENTIAL HYPERTENSION: Primary | ICD-10-CM

## 2024-01-29 DIAGNOSIS — I25.10 CORONARY ARTERIOSCLEROSIS: ICD-10-CM

## 2024-01-29 DIAGNOSIS — Z95.1 S/P CABG X 4: ICD-10-CM

## 2024-01-29 PROCEDURE — 93000 ELECTROCARDIOGRAM COMPLETE: CPT | Performed by: INTERNAL MEDICINE

## 2024-01-29 PROCEDURE — 99213 OFFICE O/P EST LOW 20 MIN: CPT | Performed by: INTERNAL MEDICINE

## 2024-01-29 NOTE — PROGRESS NOTES
"    Hospital Follow Up    LOS: 0  Patient Name: Janusz Pfeiffer  Age/Sex: 68 y.o. male  : 1955  MRN: [id number]    Day of Service: 24   Length of Stay: 0  Encounter Provider: Phan Mosley MD  Place of Service: [facility] CARDIOLOGY  Patient Care Team:  Nati Gonzalez APRN as PCP - General (Family Medicine)  Phan Mosley MD as Consulting Physician (Cardiology)    Subjective:     Chief Complaint: ***    Interval History: ***    Objective:     Objective:  Heart Rate:  [59] 59  BP: (118)/(68) 118/68   [unfilled]  Body mass index is 31.79 kg/m².      24  0907   Weight: 93.4 kg (206 lb)     [unfilled]      Physical Exam:   General :  Alert, cooperative, in no acute distress.  Neuro:   Alert,cooperative and oriented.  Lungs:  CTAB. Normal respiratory effort and rate.  CV:  Regular rate and rhythm, normal S1 and S2, no murmurs, gallops or rubs.   ABD:  Soft, nontender, nondistended. Positive bowel sounds.  Extr:  No edema or cyanosis, moves all extremities.    Lab Review:         Invalid input(s): \"ALKPO4\"                        Invalid input(s): \"LDLCALC\"            Current Medications:   Scheduled Meds:  Continuous Infusions:No current facility-administered medications for this visit.      Allergies:  Allergies   Allergen Reactions    Morphine And Related Hives     soa       Assessment:       * No active hospital problems. *        Plan:           Phan Mosley MD  24  09:20 EST      "

## 2024-02-01 NOTE — PROGRESS NOTES
"      CARDIOLOGY    Phan Mosley MD    ENCOUNTER DATE:  01/29/2024    Janusz Pfeiffer / 68 y.o. / male        CHIEF COMPLAINT / REASON FOR OFFICE VISIT     S/P CABG x 4 (01/27/2023 Follow up)  Hypertension      HISTORY OF PRESENT ILLNESS       HPI  Janusz Pfeiffer is a 68 y.o. male who presents today for reevaluation.  Overall he is doing great no issues.  He continues to exercise.      The following portions of the patient's history were reviewed and updated as appropriate: allergies, current medications, past family history, past medical history, past social history, past surgical history and problem list.      VITAL SIGNS     Visit Vitals  /68 (BP Location: Left arm)   Pulse 59   Ht 171.5 cm (67.5\")   Wt 93.4 kg (206 lb)   BMI 31.79 kg/m²         Wt Readings from Last 3 Encounters:   01/29/24 93.4 kg (206 lb)   12/21/23 91.6 kg (202 lb)   05/31/23 92.1 kg (203 lb)     Body mass index is 31.79 kg/m².      REVIEW OF SYSTEMS   ROS        PHYSICAL EXAMINATION     Vitals reviewed.   Constitutional:       Appearance: Healthy appearance.   Pulmonary:      Effort: Pulmonary effort is normal.   Cardiovascular:      Normal rate. Regular rhythm. Normal S1. Normal S2.       Murmurs: There is no murmur.      No gallop.  No click. No rub.   Pulses:     Intact distal pulses.   Edema:     Peripheral edema absent.   Neurological:      Mental Status: Alert.           REVIEWED DATA       ECG 12 Lead    Date/Time: 1/29/2024 9:10 AM  Performed by: Phan Mosley MD    Authorized by: Phan Mosley MD  Comparison: compared with previous ECG from 1/27/2023  Similar to previous ECG  Rhythm: sinus rhythm  Other findings: non-specific ST-T wave changes    Clinical impression: abnormal EKG          Cardiac Procedures:      Lipid Panel          5/24/2023    08:13   Lipid Panel   Total Cholesterol 140    Triglycerides 116    HDL Cholesterol 39    VLDL Cholesterol 21    LDL Cholesterol  80    LDL/HDL Ratio 1.99  "         ASSESSMENT & PLAN      Diagnosis Plan   1. Essential hypertension        2. Coronary arteriosclerosis        3. S/P CABG x 4              SUMMARY/DISCUSSION  Hypertension blood pressures good.  Coronary artery disease.  Status post coronary artery bypass grafting clinically is doing well.  At this point I continue the same follow-up in 1 year or sooner if issues.        MEDICATIONS         Discharge Medications            Accurate as of January 29, 2024 11:59 PM. If you have any questions, ask your nurse or doctor.                Continue These Medications        Instructions Start Date   aspirin 81 MG tablet   81 mg, Oral, Daily      atorvastatin 40 MG tablet  Commonly known as: LIPITOR   40 mg, Oral, Daily      CBD oral oil  Commonly known as: cannabidiol   No dose, route, or frequency recorded.      cholecalciferol 25 MCG (1000 UT) tablet  Commonly known as: VITAMIN D3   1,000 Units, Oral, Daily      fluorouracil 5 % cream  Commonly known as: EFUDEX   No dose, route, or frequency recorded.      hydroCHLOROthiazide 25 MG tablet  Commonly known as: HYDRODIURIL   25 mg, Oral, Daily      lisinopril 20 MG tablet  Commonly known as: PRINIVIL,ZESTRIL   40 mg, Oral, Daily      metoprolol tartrate 25 MG tablet  Commonly known as: LOPRESSOR   TAKE 1 TABLET BY MOUTH TWICE DAILY      tadalafil 5 MG tablet  Commonly known as: CIALIS   No dose, route, or frequency recorded.      zolpidem 5 MG tablet  Commonly known as: AMBIEN   5 mg, Oral, Nightly PRN      ZyrTEC Allergy 10 MG tablet  Generic drug: cetirizine   No dose, route, or frequency recorded.                   **Dragon Disclaimer:   Much of this encounter note is an electronic transcription/translation of spoken language to printed text. The electronic translation of spoken language may permit erroneous, or at times, nonsensical words or phrases to be inadvertently transcribed. Although I have reviewed the note for such errors, some may still exist.

## 2024-05-20 DIAGNOSIS — I49.3 PVC (PREMATURE VENTRICULAR CONTRACTION): Primary | ICD-10-CM

## 2024-05-22 DIAGNOSIS — Z00.00 ROUTINE HEALTH MAINTENANCE: ICD-10-CM

## 2024-05-22 DIAGNOSIS — Z12.5 SCREENING PSA (PROSTATE SPECIFIC ANTIGEN): ICD-10-CM

## 2024-05-22 DIAGNOSIS — R73.03 PREDIABETES: Primary | ICD-10-CM

## 2024-05-31 LAB
ALBUMIN SERPL-MCNC: 4 G/DL (ref 3.5–5.2)
ALBUMIN/GLOB SERPL: 1.8 G/DL
ALP SERPL-CCNC: 99 U/L (ref 39–117)
ALT SERPL-CCNC: 23 U/L (ref 1–41)
APPEARANCE UR: CLEAR
AST SERPL-CCNC: 21 U/L (ref 1–40)
BACTERIA #/AREA URNS HPF: NORMAL /HPF
BASOPHILS # BLD AUTO: 0.07 10*3/MM3 (ref 0–0.2)
BASOPHILS NFR BLD AUTO: 1 % (ref 0–1.5)
BILIRUB SERPL-MCNC: 0.6 MG/DL (ref 0–1.2)
BILIRUB UR QL STRIP: NEGATIVE
BUN SERPL-MCNC: 23 MG/DL (ref 8–23)
BUN/CREAT SERPL: 18.5 (ref 7–25)
CALCIUM SERPL-MCNC: 9.1 MG/DL (ref 8.6–10.5)
CASTS URNS MICRO: NORMAL
CHLORIDE SERPL-SCNC: 103 MMOL/L (ref 98–107)
CHOLEST SERPL-MCNC: 161 MG/DL (ref 0–200)
CO2 SERPL-SCNC: 25.3 MMOL/L (ref 22–29)
COLOR UR: YELLOW
CREAT SERPL-MCNC: 1.24 MG/DL (ref 0.76–1.27)
EGFRCR SERPLBLD CKD-EPI 2021: 62.5 ML/MIN/1.73
EOSINOPHIL # BLD AUTO: 0.29 10*3/MM3 (ref 0–0.4)
EOSINOPHIL NFR BLD AUTO: 4 % (ref 0.3–6.2)
EPI CELLS #/AREA URNS HPF: NORMAL /HPF
ERYTHROCYTE [DISTWIDTH] IN BLOOD BY AUTOMATED COUNT: 12.2 % (ref 12.3–15.4)
GLOBULIN SER CALC-MCNC: 2.2 GM/DL
GLUCOSE SERPL-MCNC: 127 MG/DL (ref 65–99)
GLUCOSE UR QL STRIP: NEGATIVE
HBA1C MFR BLD: 6.6 % (ref 4.8–5.6)
HCT VFR BLD AUTO: 43.8 % (ref 37.5–51)
HDLC SERPL-MCNC: 45 MG/DL (ref 40–60)
HGB BLD-MCNC: 14.9 G/DL (ref 13–17.7)
HGB UR QL STRIP: NEGATIVE
IMM GRANULOCYTES # BLD AUTO: 0.03 10*3/MM3 (ref 0–0.05)
IMM GRANULOCYTES NFR BLD AUTO: 0.4 % (ref 0–0.5)
KETONES UR QL STRIP: NEGATIVE
LDLC SERPL CALC-MCNC: 92 MG/DL (ref 0–100)
LDLC/HDLC SERPL: 1.98 {RATIO}
LEUKOCYTE ESTERASE UR QL STRIP: NEGATIVE
LYMPHOCYTES # BLD AUTO: 1.9 10*3/MM3 (ref 0.7–3.1)
LYMPHOCYTES NFR BLD AUTO: 26.2 % (ref 19.6–45.3)
MCH RBC QN AUTO: 31.8 PG (ref 26.6–33)
MCHC RBC AUTO-ENTMCNC: 34 G/DL (ref 31.5–35.7)
MCV RBC AUTO: 93.4 FL (ref 79–97)
MONOCYTES # BLD AUTO: 0.75 10*3/MM3 (ref 0.1–0.9)
MONOCYTES NFR BLD AUTO: 10.3 % (ref 5–12)
NEUTROPHILS # BLD AUTO: 4.22 10*3/MM3 (ref 1.7–7)
NEUTROPHILS NFR BLD AUTO: 58.1 % (ref 42.7–76)
NITRITE UR QL STRIP: NEGATIVE
NRBC BLD AUTO-RTO: 0 /100 WBC (ref 0–0.2)
PH UR STRIP: 6 [PH] (ref 5–8)
PLATELET # BLD AUTO: 238 10*3/MM3 (ref 140–450)
POTASSIUM SERPL-SCNC: 4.2 MMOL/L (ref 3.5–5.2)
PROT SERPL-MCNC: 6.2 G/DL (ref 6–8.5)
PROT UR QL STRIP: NEGATIVE
PSA SERPL-MCNC: 0.28 NG/ML (ref 0–4)
RBC # BLD AUTO: 4.69 10*6/MM3 (ref 4.14–5.8)
RBC #/AREA URNS HPF: NORMAL /HPF
SODIUM SERPL-SCNC: 138 MMOL/L (ref 136–145)
SP GR UR STRIP: 1.02 (ref 1–1.03)
TRIGL SERPL-MCNC: 134 MG/DL (ref 0–150)
TSH SERPL DL<=0.005 MIU/L-ACNC: 1.17 UIU/ML (ref 0.27–4.2)
UROBILINOGEN UR STRIP-MCNC: NORMAL MG/DL
VLDLC SERPL CALC-MCNC: 24 MG/DL (ref 5–40)
WBC # BLD AUTO: 7.26 10*3/MM3 (ref 3.4–10.8)
WBC #/AREA URNS HPF: NORMAL /HPF

## 2024-06-03 ENCOUNTER — TELEPHONE (OUTPATIENT)
Dept: CARDIOLOGY | Facility: CLINIC | Age: 69
End: 2024-06-03

## 2024-06-04 ENCOUNTER — OFFICE VISIT (OUTPATIENT)
Dept: INTERNAL MEDICINE | Facility: CLINIC | Age: 69
End: 2024-06-04
Payer: COMMERCIAL

## 2024-06-04 VITALS
BODY MASS INDEX: 31.34 KG/M2 | DIASTOLIC BLOOD PRESSURE: 78 MMHG | OXYGEN SATURATION: 97 % | TEMPERATURE: 98 F | RESPIRATION RATE: 10 BRPM | HEART RATE: 58 BPM | WEIGHT: 206.8 LBS | SYSTOLIC BLOOD PRESSURE: 120 MMHG | HEIGHT: 68 IN

## 2024-06-04 DIAGNOSIS — I49.3 FREQUENT PVCS: Primary | ICD-10-CM

## 2024-06-04 DIAGNOSIS — E11.9 TYPE 2 DIABETES MELLITUS WITHOUT COMPLICATION, WITHOUT LONG-TERM CURRENT USE OF INSULIN: ICD-10-CM

## 2024-06-04 DIAGNOSIS — I10 ESSENTIAL HYPERTENSION: ICD-10-CM

## 2024-06-04 DIAGNOSIS — Z00.00 ANNUAL PHYSICAL EXAM: Primary | ICD-10-CM

## 2024-06-04 DIAGNOSIS — E78.5 DYSLIPIDEMIA: ICD-10-CM

## 2024-06-04 PROBLEM — R73.03 PREDIABETES: Status: RESOLVED | Noted: 2023-05-31 | Resolved: 2024-06-04

## 2024-06-04 PROCEDURE — 99214 OFFICE O/P EST MOD 30 MIN: CPT | Performed by: NURSE PRACTITIONER

## 2024-06-04 PROCEDURE — 99397 PER PM REEVAL EST PAT 65+ YR: CPT | Performed by: NURSE PRACTITIONER

## 2024-06-04 RX ORDER — HYDROCHLOROTHIAZIDE 25 MG/1
12.5 TABLET ORAL DAILY
Start: 2024-06-04

## 2024-06-04 NOTE — PROGRESS NOTES
Subjective   Janusz Pfeiffer is a 69 y.o. male. Patient is here today for   Chief Complaint   Patient presents with    Annual Exam     Physical           Vitals:    06/04/24 0826   BP: 120/78   Pulse: 58   Resp: 10   Temp: 98 °F (36.7 °C)   SpO2: 97%     Body mass index is 31.44 kg/m².    The following portions of the patient's history were reviewed and updated as appropriate: allergies, current medications, past family history, past medical history, past social history, past surgical history and problem list.    Past Medical History:   Diagnosis Date    Allergic     Benign prostatic hyperplasia     CAD (coronary artery disease)     History of transfusion     Hypocholesteremia     Ischemic heart disease     Keratosis     Myocardial disease     Myocardial infarction     PAC (premature atrial contraction)     PVC (premature ventricular contraction)     Skin cancer of face       Allergies   Allergen Reactions    Morphine And Codeine Hives     soa      Social History     Socioeconomic History    Marital status:    Tobacco Use    Smoking status: Never    Smokeless tobacco: Never    Tobacco comments:     no caffeine    Vaping Use    Vaping status: Never Used   Substance and Sexual Activity    Alcohol use: Yes     Alcohol/week: 2.0 standard drinks of alcohol     Types: 2 Glasses of wine per week     Comment: social    Drug use: No    Sexual activity: Yes     Partners: Female     Birth control/protection: Post-menopausal        Current Outpatient Medications:     aspirin 81 MG tablet, Take 1 tablet by mouth Daily., Disp: , Rfl:     atorvastatin (LIPITOR) 40 MG tablet, TAKE 1 TABLET BY MOUTH EVERY DAY, Disp: 90 tablet, Rfl: 3    CBD (cannabidiol) oral oil, , Disp: , Rfl:     cetirizine (ZyrTEC Allergy) 10 MG tablet, , Disp: , Rfl:     Cholecalciferol (VITAMIN D) 1000 units tablet, Take 1 tablet by mouth Daily., Disp: , Rfl:     fluorouracil (EFUDEX) 5 % cream, , Disp: , Rfl:     hydroCHLOROthiazide 25 MG tablet,  Take 0.5 tablets by mouth Daily., Disp: , Rfl:     lisinopril (PRINIVIL,ZESTRIL) 20 MG tablet, Take 2 tablets by mouth Daily., Disp: 180 tablet, Rfl: 3    metoprolol tartrate (LOPRESSOR) 25 MG tablet, TAKE 1 TABLET BY MOUTH TWO TIMES PER DAY, Disp: 180 tablet, Rfl: 1    tadalafil (CIALIS) 5 MG tablet, , Disp: , Rfl:     zolpidem (AMBIEN) 5 MG tablet, Take 1 tablet by mouth At Night As Needed for Sleep., Disp: 30 tablet, Rfl: 2       Objective   History of Present Illness   Janusz Pfeiffer 69 y.o. male who presents for an Annual physical exam and for a follow up for Prediabetes, HTN, and HLD. He recently saw cardiology and had a holter monitor done. It showed frequent PVC's and consult with electrophysiology was scheduled.   He has had some low blood pressures readings at home so he was advised to hold hctz. He then noticed that his blood pressure starting increasing and he restarting it. He still is having readings 110-120/70-80. He denies dizziness but has been fatigued   Lab results discussed in detail with the patient.  Plan to update vaccines if needed today.    Health Habits:  Dental Exam. up to date  Eye Exam. up to date  Exercise: 7 times/week.  Current exercise activities include: cardiovascular workout on exercise equipment  He eats a well balanced diet. Has decreased ETOH to 2 /week     Preventative counseling  Discussed face to face the importance of healthy diet and exercise.     Lab Results (most recent)       Orders Only on 05/22/2024   Component Date Value Ref Range Status    Glucose 05/31/2024 127 (H)  65 - 99 mg/dL Final    BUN 05/31/2024 23  8 - 23 mg/dL Final    Creatinine 05/31/2024 1.24  0.76 - 1.27 mg/dL Final    EGFR Result 05/31/2024 62.5  >60.0 mL/min/1.73 Final    Comment: GFR Normal >60  Chronic Kidney Disease <60  Kidney Failure <15      BUN/Creatinine Ratio 05/31/2024 18.5  7.0 - 25.0 Final    Sodium 05/31/2024 138  136 - 145 mmol/L Final    Potassium 05/31/2024 4.2  3.5 - 5.2 mmol/L  Final    Chloride 05/31/2024 103  98 - 107 mmol/L Final    Total CO2 05/31/2024 25.3  22.0 - 29.0 mmol/L Final    Calcium 05/31/2024 9.1  8.6 - 10.5 mg/dL Final    Total Protein 05/31/2024 6.2  6.0 - 8.5 g/dL Final    Albumin 05/31/2024 4.0  3.5 - 5.2 g/dL Final    Globulin 05/31/2024 2.2  gm/dL Final    A/G Ratio 05/31/2024 1.8  g/dL Final    Total Bilirubin 05/31/2024 0.6  0.0 - 1.2 mg/dL Final    Alkaline Phosphatase 05/31/2024 99  39 - 117 U/L Final    AST (SGOT) 05/31/2024 21  1 - 40 U/L Final    ALT (SGPT) 05/31/2024 23  1 - 41 U/L Final    Total Cholesterol 05/31/2024 161  0 - 200 mg/dL Final    Comment: Cholesterol Reference Ranges  (U.S. Department of Health and Human Services ATP III  Classifications)  Desirable          <200 mg/dL  Borderline High    200-239 mg/dL  High Risk          >240 mg/dL  Triglyceride Reference Ranges  (U.S. Department of Health and Human Services ATP III  Classifications)  Normal           <150 mg/dL  Borderline High  150-199 mg/dL  High             200-499 mg/dL  Very High        >500 mg/dL  HDL Reference Ranges  (U.S. Department of Health and Human Services ATP III  Classifications)  Low     <40 mg/dl (major risk factor for CHD)  High    >60 mg/dl ('negative' risk factor for CHD)  LDL Reference Ranges  (U.S. Department of Health and Human Services ATP III  Classifications)  Optimal          <100 mg/dL  Near Optimal     100-129 mg/dL  Borderline High  130-159 mg/dL  High             160-189 mg/dL  Very High        >189 mg/dL      Triglycerides 05/31/2024 134  0 - 150 mg/dL Final    HDL Cholesterol 05/31/2024 45  40 - 60 mg/dL Final    VLDL Cholesterol Efrain 05/31/2024 24  5 - 40 mg/dL Final    LDL Chol Calc (NIH) 05/31/2024 92  0 - 100 mg/dL Final    LDL/HDL RATIO 05/31/2024 1.98   Final    PSA 05/31/2024 0.284  0.000 - 4.000 ng/mL Final    Comment: Testing Method: [employer] Electrochemiluminescence  Immunoassay(ECLIA)  Values obtained with different assay methods or kits  cannot  be used interchangeably.      WBC 05/31/2024 7.26  3.40 - 10.80 10*3/mm3 Final    RBC 05/31/2024 4.69  4.14 - 5.80 10*6/mm3 Final    Hemoglobin 05/31/2024 14.9  13.0 - 17.7 g/dL Final    Hematocrit 05/31/2024 43.8  37.5 - 51.0 % Final    MCV 05/31/2024 93.4  79.0 - 97.0 fL Final    MCH 05/31/2024 31.8  26.6 - 33.0 pg Final    MCHC 05/31/2024 34.0  31.5 - 35.7 g/dL Final    RDW 05/31/2024 12.2 (L)  12.3 - 15.4 % Final    Platelets 05/31/2024 238  140 - 450 10*3/mm3 Final    Neutrophil Rel % 05/31/2024 58.1  42.7 - 76.0 % Final    Lymphocyte Rel % 05/31/2024 26.2  19.6 - 45.3 % Final    Monocyte Rel % 05/31/2024 10.3  5.0 - 12.0 % Final    Eosinophil Rel % 05/31/2024 4.0  0.3 - 6.2 % Final    Basophil Rel % 05/31/2024 1.0  0.0 - 1.5 % Final    Neutrophils Absolute 05/31/2024 4.22  1.70 - 7.00 10*3/mm3 Final    Lymphocytes Absolute 05/31/2024 1.90  0.70 - 3.10 10*3/mm3 Final    Monocytes Absolute 05/31/2024 0.75  0.10 - 0.90 10*3/mm3 Final    Eosinophils Absolute 05/31/2024 0.29  0.00 - 0.40 10*3/mm3 Final    Basophils Absolute 05/31/2024 0.07  0.00 - 0.20 10*3/mm3 Final    Immature Granulocyte Rel % 05/31/2024 0.4  0.0 - 0.5 % Final    Immature Grans Absolute 05/31/2024 0.03  0.00 - 0.05 10*3/mm3 Final    nRBC 05/31/2024 0.0  0.0 - 0.2 /100 WBC Final    TSH 05/31/2024 1.170  0.270 - 4.200 uIU/mL Final    Hemoglobin A1C 05/31/2024 6.60 (H)  4.80 - 5.60 % Final    Comment: Hemoglobin A1C Ranges:  Increased Risk for Diabetes  5.7% to 6.4%  Diabetes                     >= 6.5%  Diabetic Goal                < 7.0%      Specific Gravity, UA 05/31/2024 1.019  1.005 - 1.030 Final    pH, UA 05/31/2024 6.0  5.0 - 8.0 Final    Color, UA 05/31/2024 Yellow   Final    REFERENCE RANGE: Yellow, Straw    Appearance, UA 05/31/2024 Clear  Clear Final    Leukocytes, UA 05/31/2024 Negative  Negative Final    Protein 05/31/2024 Negative  Negative Final    Glucose, UA 05/31/2024 Negative  Negative Final    Ketones 05/31/2024  Negative  Negative Final    Blood, UA 05/31/2024 Negative  Negative Final    Bilirubin, UA 05/31/2024 Negative  Negative Final    Urobilinogen, UA 05/31/2024 Comment   Final    Comment: 1.0 E.U./dL  REFERENCE RANGE: 0.2 - 1.0 E.U./dL      Nitrite, UA 05/31/2024 Negative  Negative Final    WBC, UA 05/31/2024 0-2  /HPF Final    REFERENCE RANGE: None Seen, 0-2    RBC, UA 05/31/2024 0-2  /HPF Final    REFERENCE RANGE: None Seen, 0-2    Epithelial Cells (non renal) 05/31/2024 0-2  /HPF Final    REFERENCE RANGE: None Seen, 0-2    Cast Type 05/31/2024 Comment   Final    HYALINE CASTS, UA            0-2              /LPF       None Seen  N    Bacteria, UA 05/31/2024 Comment  None Seen /HPF Final    None Seen                   Review of Systems   Constitutional:  Positive for fatigue.   HENT: Negative.     Respiratory:  Negative for cough and shortness of breath.    Cardiovascular:  Positive for chest pain (intermittent, followed by cardiology). Negative for palpitations and leg swelling.   Gastrointestinal:  Negative for abdominal distention, abdominal pain and constipation.   Genitourinary:  Negative for dysuria and frequency.   Musculoskeletal:  Positive for arthralgias and back pain.   Psychiatric/Behavioral:  Positive for sleep disturbance. Negative for dysphoric mood. The patient is not nervous/anxious.        Physical Exam  Vitals and nursing note reviewed.   Constitutional:       General: He is not in acute distress.     Appearance: Normal appearance. He is well-developed and well-groomed.   HENT:      Head: Normocephalic.      Right Ear: Tympanic membrane and ear canal normal.      Left Ear: Tympanic membrane and ear canal normal.      Nose: Nose normal.      Mouth/Throat:      Pharynx: Oropharynx is clear.   Eyes:      General: Lids are normal.      Conjunctiva/sclera: Conjunctivae normal.      Comments: Wearing glasses    Neck:      Thyroid: No thyromegaly.   Cardiovascular:      Rate and Rhythm: Normal rate and  regular rhythm.      Heart sounds: Normal heart sounds.   Pulmonary:      Effort: Pulmonary effort is normal.      Breath sounds: Normal breath sounds.   Abdominal:      General: Bowel sounds are normal.      Palpations: Abdomen is soft.   Musculoskeletal:      Cervical back: Neck supple.   Skin:     General: Skin is warm and dry.   Psychiatric:         Attention and Perception: Attention normal.         Mood and Affect: Mood normal.         Behavior: Behavior is cooperative.         ASSESSMENT       Problems Addressed this Visit       Essential hypertension    Relevant Medications    hydroCHLOROthiazide 25 MG tablet    Dyslipidemia    Type 2 diabetes mellitus without complication, without long-term current use of insulin     Other Visit Diagnoses       Annual physical exam    -  Primary          Diagnoses         Codes Comments    Annual physical exam    -  Primary ICD-10-CM: Z00.00  ICD-9-CM: V70.0     Essential hypertension     ICD-10-CM: I10  ICD-9-CM: 401.9     Type 2 diabetes mellitus without complication, without long-term current use of insulin     ICD-10-CM: E11.9  ICD-9-CM: 250.00     Dyslipidemia     ICD-10-CM: E78.5  ICD-9-CM: 272.4             PLAN    HTN- he has had some low blood pressure readings at home, will continue lisinopril and decrease hctz to 12.5 mg goal is less than 130/80  Diabetes- A1c is now in the diabetic range. Discussed metformin but patient prefers dietary changes. He will continue exercise. Offered referral to diabetic education but he declined  CAD- followed by cardiology  HLD- continue statin   Age and sex appropriate physical exam performed and documented. Updated past medical, family, social and surgical histories as well as allergies and care team list. Addressed care gaps listed in the medical record.   -Encouraged annual dental and vision exams as part of their overall health.   -Encouraged  exercise  combined with a well-balanced diet.   -Immunizations reviewed and updated  in EMR. Declined covid booster, discussed RSV vaccine at pharmacy       Return in about 6 months (around 12/4/2024) for a1c, cmp , urine microalbumin .  Patient was given instructions and counseling regarding his  condition for health maintenance advice.  Please see specific information pulled into the AVS if appropriate.

## 2024-06-26 RX ORDER — LISINOPRIL 20 MG/1
40 TABLET ORAL DAILY
Qty: 180 TABLET | Refills: 3 | Status: SHIPPED | OUTPATIENT
Start: 2024-06-26

## 2024-08-08 ENCOUNTER — OFFICE VISIT (OUTPATIENT)
Age: 69
End: 2024-08-08
Payer: COMMERCIAL

## 2024-08-08 VITALS
DIASTOLIC BLOOD PRESSURE: 76 MMHG | HEART RATE: 108 BPM | SYSTOLIC BLOOD PRESSURE: 112 MMHG | WEIGHT: 202 LBS | HEIGHT: 68 IN | BODY MASS INDEX: 30.62 KG/M2

## 2024-08-08 DIAGNOSIS — I10 ESSENTIAL HYPERTENSION: ICD-10-CM

## 2024-08-08 DIAGNOSIS — I25.10 CORONARY ARTERIOSCLEROSIS: ICD-10-CM

## 2024-08-08 DIAGNOSIS — I49.3 PVC (PREMATURE VENTRICULAR CONTRACTION): Primary | ICD-10-CM

## 2024-08-08 DIAGNOSIS — Z95.1 S/P CABG X 4: ICD-10-CM

## 2024-08-08 NOTE — PROGRESS NOTES
Date of Office Visit: 2024  Encounter Provider: John Rivera MD  Place of Service: [facility] CARDIOLOGY  Patient Name: Janusz Pfeiffer  :1955    Chief Complaint   Patient presents with    frequent PVCs   :     HPI: Janusz Pfeiffer is a 69 y.o. male who presents today for premature ventricular contractions    The patient works as an EMT.  He noticed some palpitations.  He did a twelve-lead at the station, noted to frequent PVCs.  He otherwise felt well.  He discussed this with Dr. Doss, and they did a 24-hour monitor, which reported 47% burden of premature ventricular contractions.    He has a long history of PVCs, really starting after his heart attack in .  Has been something that is intermittently noticed for years, he did not feel that things were particularly different while wearing the monitor.    He denies any symptoms.  Remains active working as an EMT, and exercising most days.    He has not had any recent assessment of his ejection fraction          Past Medical History:   Diagnosis Date    Allergic     Benign prostatic hyperplasia     CAD (coronary artery disease)     History of transfusion     Hypocholesteremia     Ischemic heart disease     Keratosis     Myocardial disease     Myocardial infarction     PAC (premature atrial contraction)     PVC (premature ventricular contraction)     Skin cancer of face        Past Surgical History:   Procedure Laterality Date    ADENOIDECTOMY      CARDIAC CATHETERIZATION      COLONOSCOPY      COLONOSCOPY N/A 3/21/2019    Procedure: COLONOSCOPY TO CECUM;  Surgeon: Carlos Storey MD;  Location: [facility] ENDOSCOPY;  Service: General    CORONARY ANGIOPLASTY  2000    stent LAD    CORONARY ARTERY BYPASS GRAFT  2016    4 vessel    CORONARY STENT PLACEMENT      LAD    EYE SURGERY      FRACTURE SURGERY      RETINAL LASER PROCEDURE Left     SKIN BIOPSY      TONSILLECTOMY         Social History      Socioeconomic History    Marital status:    Tobacco Use    Smoking status: Never    Smokeless tobacco: Never    Tobacco comments:     no caffeine    Vaping Use    Vaping status: Never Used   Substance and Sexual Activity    Alcohol use: Yes     Alcohol/week: 2.0 standard drinks of alcohol     Types: 2 Glasses of wine per week     Comment: social    Drug use: No    Sexual activity: Yes     Partners: Female     Birth control/protection: Post-menopausal       Family History   Problem Relation Age of Onset    Heart disease Mother     Heart attack Mother     Hypertension Mother     Hyperlipidemia Mother     Cancer Father         Skin, non Hodgkins lymphoma    Hearing loss Father         [employer] Active Duty    Heart disease Paternal Uncle        Review of Systems   Constitutional: Negative.   Cardiovascular:  Positive for palpitations.   Respiratory: Negative.     Gastrointestinal: Negative.        Allergies   Allergen Reactions    Morphine And Codeine Hives     soa         Current Outpatient Medications:     aspirin 81 MG tablet, Take 1 tablet by mouth Daily., Disp: , Rfl:     atorvastatin (LIPITOR) 40 MG tablet, TAKE 1 TABLET BY MOUTH EVERY DAY, Disp: 90 tablet, Rfl: 3    CBD (cannabidiol) oral oil, , Disp: , Rfl:     cetirizine (ZyrTEC Allergy) 10 MG tablet, , Disp: , Rfl:     Cholecalciferol (VITAMIN D) 1000 units tablet, Take 1 tablet by mouth Daily., Disp: , Rfl:     fluorouracil (EFUDEX) 5 % cream, 2 (Two) Times a Day As Needed., Disp: , Rfl:     hydroCHLOROthiazide 25 MG tablet, Take 0.5 tablets by mouth Daily., Disp: , Rfl:     lisinopril (PRINIVIL,ZESTRIL) 20 MG tablet, TAKE 2 TABLETS BY MOUTH EVERY DAY, Disp: 180 tablet, Rfl: 3    metoprolol tartrate (LOPRESSOR) 25 MG tablet, TAKE 1 TABLET BY MOUTH TWO TIMES PER DAY, Disp: 180 tablet, Rfl: 1    tadalafil (CIALIS) 5 MG tablet, Daily As Needed., Disp: , Rfl:     zolpidem (AMBIEN) 5 MG tablet, Take 1 tablet by mouth At Night As Needed for Sleep., Disp: 30  "tablet, Rfl: 2      Objective:     Vitals:    08/08/24 0851   BP: 112/76   Pulse: 108   Weight: 91.6 kg (202 lb)   Height: 172.7 cm (68\")     Body mass index is 30.71 kg/m².    PHYSICAL EXAM:    Vitals and nursing note reviewed.   Constitutional:       Appearance: Well-developed.   Eyes:      General:         Right eye: No discharge.         Left eye: No discharge.      Conjunctiva/sclera: Conjunctivae normal.   HENT:      Head: Normocephalic and atraumatic.   Neck:      Vascular: No JVD.   Pulmonary:      Effort: No respiratory distress.   Cardiovascular:      Normal rate.   Edema:     Peripheral edema absent.   Abdominal:      General: There is no distension.      Tenderness: There is no abdominal tenderness.   Musculoskeletal: Normal range of motion.      Cervical back: Neck supple. Neurological:      Cranial Nerves: No cranial nerve deficit.             ECG 12 Lead    Date/Time: 8/8/2024 9:26 AM  Performed by: John Rivera MD    Authorized by: John Rivera MD  Comparison: compared with previous ECG from 2/2/2024  Similar to previous ECG  Rhythm: sinus rhythm  Ectopy: unifocal PVCs            Assessment:       Diagnosis Plan   1. PVC (premature ventricular contraction)  Adult Transthoracic Echo Complete w/ Color, Spectral and Contrast if Necessary Per Protocol      2. Coronary arteriosclerosis        3. Essential hypertension        4. S/P CABG x 4               Plan:       He has a long history of PVCs, the frequency reported on the 24-hour monitor seems out of character.    We either called a very bad day, or perhaps there is something wrong in the reporting of the monitor.    I looked through his past EKGs, and treadmill stress test, he seems to have a low to moderate burden of PVCs.    He has very mild associated symptoms, not causing any limitations.  I do not think he requires treatment at this time, particularly if his ejection fraction is normal or nearly normal    We discussed continued " observation, or perhaps just checking an echo since 1 and not been done in several years.    We decided to proceed with echo, unless there is a really significant depression in his ejection fraction, I think we will just observe the PVCs.    As always, it has been a pleasure to participate in your patient's care.      Sincerely,         John Rivera MD

## 2024-09-10 ENCOUNTER — HOSPITAL ENCOUNTER (OUTPATIENT)
Dept: CARDIOLOGY | Facility: HOSPITAL | Age: 69
Discharge: HOME | End: 2024-09-10
Admitting: INTERNAL MEDICINE
Payer: COMMERCIAL

## 2024-09-10 VITALS
SYSTOLIC BLOOD PRESSURE: 120 MMHG | OXYGEN SATURATION: 97 % | DIASTOLIC BLOOD PRESSURE: 84 MMHG | WEIGHT: 202 LBS | BODY MASS INDEX: 30.62 KG/M2 | HEIGHT: 68 IN | HEART RATE: 90 BPM

## 2024-09-10 DIAGNOSIS — I49.3 PVC (PREMATURE VENTRICULAR CONTRACTION): ICD-10-CM

## 2024-09-10 DIAGNOSIS — I51.3 LV (LEFT VENTRICULAR) MURAL THROMBUS: Primary | ICD-10-CM

## 2024-09-10 LAB
AORTIC ARCH: 2.7 CM
AORTIC DIMENSIONLESS INDEX: 0.6 (DI)
ASCENDING AORTA: 3.3 CM
BH CV ECHO MEAS - ACS: 1.7 CM
BH CV ECHO MEAS - AO MAX PG: 5.7 MMHG
BH CV ECHO MEAS - AO MEAN PG: 3 MMHG
BH CV ECHO MEAS - AO ROOT DIAM: 3.5 CM
BH CV ECHO MEAS - AO V2 MAX: 119 CM/SEC
BH CV ECHO MEAS - AO V2 VTI: 29.9 CM
BH CV ECHO MEAS - AVA(I,D): 2.3 CM2
BH CV ECHO MEAS - EDV(CUBED): 175.6 ML
BH CV ECHO MEAS - EDV(MOD-SP2): 138 ML
BH CV ECHO MEAS - EDV(MOD-SP4): 126 ML
BH CV ECHO MEAS - EF(MOD-BP): 44.2 %
BH CV ECHO MEAS - EF(MOD-SP2): 43.5 %
BH CV ECHO MEAS - EF(MOD-SP4): 43.7 %
BH CV ECHO MEAS - ESV(CUBED): 89 ML
BH CV ECHO MEAS - ESV(MOD-SP2): 78 ML
BH CV ECHO MEAS - ESV(MOD-SP4): 71 ML
BH CV ECHO MEAS - FS: 20.3 %
BH CV ECHO MEAS - IVS/LVPW: 0.91 CM
BH CV ECHO MEAS - IVSD: 1 CM
BH CV ECHO MEAS - LAT PEAK E' VEL: 10.7 CM/SEC
BH CV ECHO MEAS - LV DIASTOLIC VOL/BSA (35-75): 61.4 CM2
BH CV ECHO MEAS - LV MASS(C)D: 234.3 GRAMS
BH CV ECHO MEAS - LV MAX PG: 2.35 MMHG
BH CV ECHO MEAS - LV MEAN PG: 1 MMHG
BH CV ECHO MEAS - LV SYSTOLIC VOL/BSA (12-30): 34.6 CM2
BH CV ECHO MEAS - LV V1 MAX: 76.7 CM/SEC
BH CV ECHO MEAS - LV V1 VTI: 19 CM
BH CV ECHO MEAS - LVIDD: 5.6 CM
BH CV ECHO MEAS - LVIDS: 4.5 CM
BH CV ECHO MEAS - LVOT AREA: 3.6 CM2
BH CV ECHO MEAS - LVOT DIAM: 2.15 CM
BH CV ECHO MEAS - LVPWD: 1.1 CM
BH CV ECHO MEAS - MED PEAK E' VEL: 7.5 CM/SEC
BH CV ECHO MEAS - MV A DUR: 0.14 SEC
BH CV ECHO MEAS - MV A MAX VEL: 75.5 CM/SEC
BH CV ECHO MEAS - MV DEC SLOPE: 606.1 CM/SEC2
BH CV ECHO MEAS - MV DEC TIME: 0.19 SEC
BH CV ECHO MEAS - MV E MAX VEL: 82.6 CM/SEC
BH CV ECHO MEAS - MV E/A: 1.09
BH CV ECHO MEAS - MV MAX PG: 3.9 MMHG
BH CV ECHO MEAS - MV MEAN PG: 1.88 MMHG
BH CV ECHO MEAS - MV P1/2T: 47.9 MSEC
BH CV ECHO MEAS - MV V2 VTI: 15 CM
BH CV ECHO MEAS - MVA(P1/2T): 4.6 CM2
BH CV ECHO MEAS - MVA(VTI): 4.6 CM2
BH CV ECHO MEAS - PA ACC TIME: 0.06 SEC
BH CV ECHO MEAS - PA V2 MAX: 107.2 CM/SEC
BH CV ECHO MEAS - PULM A REVS DUR: 0.15 SEC
BH CV ECHO MEAS - PULM A REVS VEL: 17.7 CM/SEC
BH CV ECHO MEAS - PULM DIAS VEL: 38.1 CM/SEC
BH CV ECHO MEAS - PULM S/D: 1.45
BH CV ECHO MEAS - PULM SYS VEL: 55.4 CM/SEC
BH CV ECHO MEAS - QP/QS: 1.3
BH CV ECHO MEAS - RAP SYSTOLE: 3 MMHG
BH CV ECHO MEAS - RV MAX PG: 1.63 MMHG
BH CV ECHO MEAS - RV V1 MAX: 63.8 CM/SEC
BH CV ECHO MEAS - RV V1 VTI: 16.5 CM
BH CV ECHO MEAS - RVOT DIAM: 2.6 CM
BH CV ECHO MEAS - RVSP: 19.8 MMHG
BH CV ECHO MEAS - SV(LVOT): 68.9 ML
BH CV ECHO MEAS - SV(MOD-SP2): 60 ML
BH CV ECHO MEAS - SV(MOD-SP4): 55 ML
BH CV ECHO MEAS - SV(RVOT): 89.8 ML
BH CV ECHO MEAS - SVI(LVOT): 33.6 ML/M2
BH CV ECHO MEAS - SVI(MOD-SP2): 29.2 ML/M2
BH CV ECHO MEAS - SVI(MOD-SP4): 26.8 ML/M2
BH CV ECHO MEAS - TAPSE (>1.6): 1.53 CM
BH CV ECHO MEAS - TR MAX PG: 16.8 MMHG
BH CV ECHO MEAS - TR MAX VEL: 205 CM/SEC
BH CV ECHO MEASUREMENTS AVERAGE E/E' RATIO: 9.08
BH CV VAS BP LEFT ARM: NORMAL MMHG
BH CV XLRA - RV BASE: 3.2 CM
BH CV XLRA - RV LENGTH: 7.9 CM
BH CV XLRA - RV MID: 3.2 CM
BH CV XLRA - TDI S': 8.3 CM/SEC
LEFT ATRIUM VOLUME INDEX: 34.9 ML/M2
SINUS: 3.4 CM
STJ: 2.8 CM

## 2024-09-10 PROCEDURE — 93306 TTE W/DOPPLER COMPLETE: CPT

## 2024-09-10 PROCEDURE — 93306 TTE W/DOPPLER COMPLETE: CPT | Performed by: INTERNAL MEDICINE

## 2024-09-10 PROCEDURE — 25510000001 PERFLUTREN 6.52 MG/ML SUSPENSION 2 ML VIAL: Performed by: INTERNAL MEDICINE

## 2024-09-10 RX ADMIN — PERFLUTREN 2 ML: 6.52 INJECTION, SUSPENSION INTRAVENOUS at 08:20

## 2024-09-10 NOTE — PROGRESS NOTES
We called patient about echo findings. Given LV thrombus will start apixaban 5mg BID.         We are also going to order cardiac MRI to understand LV morphology, EF, and confirm presence of thrombus.

## 2024-09-23 RX ORDER — METOPROLOL TARTRATE 25 MG/1
TABLET, FILM COATED ORAL
Qty: 180 TABLET | Refills: 1 | Status: SHIPPED | OUTPATIENT
Start: 2024-09-23

## 2024-09-27 ENCOUNTER — HOSPITAL ENCOUNTER (OUTPATIENT)
Facility: HOSPITAL | Age: 69
Discharge: HOME | End: 2024-09-27
Payer: COMMERCIAL

## 2024-09-27 DIAGNOSIS — I51.3 LV (LEFT VENTRICULAR) MURAL THROMBUS: ICD-10-CM

## 2024-09-27 PROCEDURE — A9577 INJ MULTIHANCE: HCPCS

## 2024-09-27 PROCEDURE — 0 GADOBENATE DIMEGLUMINE 529 MG/ML SOLUTION

## 2024-09-27 PROCEDURE — 75561 CARDIAC MRI FOR MORPH W/DYE: CPT

## 2024-09-27 RX ADMIN — GADOBENATE DIMEGLUMINE 19 ML: 529 INJECTION, SOLUTION INTRAVENOUS at 11:10

## 2024-10-07 ENCOUNTER — HOSPITAL ENCOUNTER (OUTPATIENT)
Dept: CARDIOLOGY | Facility: HOSPITAL | Age: 69
Discharge: HOME | End: 2024-10-07
Admitting: NURSE PRACTITIONER
Payer: COMMERCIAL

## 2024-10-07 ENCOUNTER — OFFICE VISIT (OUTPATIENT)
Dept: INTERNAL MEDICINE | Facility: CLINIC | Age: 69
End: 2024-10-07
Payer: COMMERCIAL

## 2024-10-07 VITALS
HEART RATE: 69 BPM | BODY MASS INDEX: 30.16 KG/M2 | DIASTOLIC BLOOD PRESSURE: 68 MMHG | WEIGHT: 199 LBS | RESPIRATION RATE: 18 BRPM | SYSTOLIC BLOOD PRESSURE: 120 MMHG | HEIGHT: 68 IN | OXYGEN SATURATION: 98 %

## 2024-10-07 DIAGNOSIS — M79.89 SWELLING OF RIGHT LOWER EXTREMITY: ICD-10-CM

## 2024-10-07 DIAGNOSIS — I82.491 ACUTE DEEP VEIN THROMBOSIS (DVT) OF OTHER SPECIFIED VEIN OF RIGHT LOWER EXTREMITY: ICD-10-CM

## 2024-10-07 DIAGNOSIS — M79.89 SWELLING OF RIGHT LOWER EXTREMITY: Primary | ICD-10-CM

## 2024-10-07 LAB
BH CV LOW VAS RIGHT DISTAL FEMORAL SPONT: 1
BH CV LOW VAS RIGHT GASTRONEMIUS VESSEL: 1
BH CV LOW VAS RIGHT MID FEMORAL SPONT: 1
BH CV LOW VAS RIGHT PERONEAL VESSEL: 1
BH CV LOW VAS RIGHT POPLITEAL SPONT: 1
BH CV LOW VAS RIGHT POSTERIOR TIBIAL VESSEL: 1
BH CV LOW VAS RIGHT PROXIMAL FEMORAL SPONT: 1
BH CV LOW VAS RIGHT SOLEAL VESSEL: 1
BH CV LOWER VASCULAR LEFT COMMON FEMORAL AUGMENT: NORMAL
BH CV LOWER VASCULAR LEFT COMMON FEMORAL COMPETENT: NORMAL
BH CV LOWER VASCULAR LEFT COMMON FEMORAL COMPRESS: NORMAL
BH CV LOWER VASCULAR LEFT COMMON FEMORAL PHASIC: NORMAL
BH CV LOWER VASCULAR LEFT COMMON FEMORAL SPONT: NORMAL
BH CV LOWER VASCULAR RIGHT COMMON FEMORAL AUGMENT: NORMAL
BH CV LOWER VASCULAR RIGHT COMMON FEMORAL COMPETENT: NORMAL
BH CV LOWER VASCULAR RIGHT COMMON FEMORAL COMPRESS: NORMAL
BH CV LOWER VASCULAR RIGHT COMMON FEMORAL PHASIC: NORMAL
BH CV LOWER VASCULAR RIGHT COMMON FEMORAL SPONT: NORMAL
BH CV LOWER VASCULAR RIGHT DISTAL FEMORAL COMPRESS: NORMAL
BH CV LOWER VASCULAR RIGHT DISTAL FEMORAL THROMBUS: NORMAL
BH CV LOWER VASCULAR RIGHT GASTRONEMIUS COMPRESS: NORMAL
BH CV LOWER VASCULAR RIGHT GASTRONEMIUS THROMBUS: NORMAL
BH CV LOWER VASCULAR RIGHT GREATER SAPH AK COMPRESS: NORMAL
BH CV LOWER VASCULAR RIGHT GREATER SAPH BK COMPRESS: NORMAL
BH CV LOWER VASCULAR RIGHT LESSER SAPH COMPRESS: NORMAL
BH CV LOWER VASCULAR RIGHT MID FEMORAL AUGMENT: NORMAL
BH CV LOWER VASCULAR RIGHT MID FEMORAL COMPETENT: NORMAL
BH CV LOWER VASCULAR RIGHT MID FEMORAL COMPRESS: NORMAL
BH CV LOWER VASCULAR RIGHT MID FEMORAL PHASIC: NORMAL
BH CV LOWER VASCULAR RIGHT MID FEMORAL SPONT: NORMAL
BH CV LOWER VASCULAR RIGHT MID FEMORAL THROMBUS: NORMAL
BH CV LOWER VASCULAR RIGHT PERONEAL COMPRESS: NORMAL
BH CV LOWER VASCULAR RIGHT PERONEAL THROMBUS: NORMAL
BH CV LOWER VASCULAR RIGHT POPLITEAL AUGMENT: NORMAL
BH CV LOWER VASCULAR RIGHT POPLITEAL COMPETENT: NORMAL
BH CV LOWER VASCULAR RIGHT POPLITEAL COMPRESS: NORMAL
BH CV LOWER VASCULAR RIGHT POPLITEAL PHASIC: NORMAL
BH CV LOWER VASCULAR RIGHT POPLITEAL SPONT: NORMAL
BH CV LOWER VASCULAR RIGHT POPLITEAL THROMBUS: NORMAL
BH CV LOWER VASCULAR RIGHT POSTERIOR TIBIAL COMPRESS: NORMAL
BH CV LOWER VASCULAR RIGHT POSTERIOR TIBIAL THROMBUS: NORMAL
BH CV LOWER VASCULAR RIGHT PROFUNDA FEMORAL COMPRESS: NORMAL
BH CV LOWER VASCULAR RIGHT PROXIMAL FEMORAL COMPRESS: NORMAL
BH CV LOWER VASCULAR RIGHT PROXIMAL FEMORAL THROMBUS: NORMAL
BH CV LOWER VASCULAR RIGHT SAPHENOFEMORAL JUNCTION COMPRESS: NORMAL
BH CV LOWER VASCULAR RIGHT SOLEAL COMPRESS: NORMAL
BH CV LOWER VASCULAR RIGHT SOLEAL THROMBUS: NORMAL
BH CV VAS PRELIMINARY FINDINGS SCRIPTING: 1

## 2024-10-07 PROCEDURE — 99214 OFFICE O/P EST MOD 30 MIN: CPT | Performed by: NURSE PRACTITIONER

## 2024-10-07 PROCEDURE — 93971 EXTREMITY STUDY: CPT | Performed by: SURGERY

## 2024-10-07 PROCEDURE — 93971 EXTREMITY STUDY: CPT

## 2024-10-07 NOTE — PROGRESS NOTES
Subjective   Janusz Pfeiffer is a 69 y.o. male. Patient is here today for   Chief Complaint   Patient presents with    Lower Extremity Issue   Answers submitted by the patient for this visit:  Primary Reason for Visit (Submitted on 10/6/2024)  What is the primary reason for your visit?: Extremity Pain  Lower Extremity Injury Questionnaire (Submitted on 10/6/2024)  Chief Complaint: Extremity pain  Injury: No         Vitals:    10/07/24 0936   BP: 120/68   Pulse: 69   Resp: 18   SpO2: 98%     Body mass index is 30.26 kg/m².  The following portions of the patient's history were reviewed and updated as appropriate: allergies, current medications, past family history, past medical history, past social history, past surgical history and problem list.    Past Medical History:   Diagnosis Date    Allergic     Benign prostatic hyperplasia     CAD (coronary artery disease)     History of transfusion     Hyperlipidemia 2011    Hypertension 2017    Hypocholesteremia     Ischemic heart disease     Keratosis     Kidney stone 2006    9mm stone    Myocardial disease     Myocardial infarction     PAC (premature atrial contraction)     PVC (premature ventricular contraction)     Skin cancer of face       Allergies   Allergen Reactions    Morphine And Codeine Hives     soa      Social History     Socioeconomic History    Marital status:    Tobacco Use    Smoking status: Never    Smokeless tobacco: Never    Tobacco comments:     no caffeine    Vaping Use    Vaping status: Never Used   Substance and Sexual Activity    Alcohol use: Yes     Alcohol/week: 2.0 standard drinks of alcohol     Types: 2 Glasses of wine per week     Comment: social    Drug use: No    Sexual activity: Yes     Partners: Female     Birth control/protection: Post-menopausal        Current Outpatient Medications:     apixaban (ELIQUIS) 5 MG tablet tablet, Take 1 tablet by mouth 2 (Two) Times a Day., Disp: 60 tablet, Rfl: 2    aspirin 81 MG tablet, Take 1  tablet by mouth Daily., Disp: , Rfl:     atorvastatin (LIPITOR) 40 MG tablet, TAKE 1 TABLET BY MOUTH EVERY DAY, Disp: 90 tablet, Rfl: 3    CBD (cannabidiol) oral oil, , Disp: , Rfl:     cetirizine (ZyrTEC Allergy) 10 MG tablet, , Disp: , Rfl:     Cholecalciferol (VITAMIN D) 1000 units tablet, Take 1 tablet by mouth Daily., Disp: , Rfl:     fluorouracil (EFUDEX) 5 % cream, 2 (Two) Times a Day As Needed., Disp: , Rfl:     hydroCHLOROthiazide 25 MG tablet, Take 0.5 tablets by mouth Daily., Disp: , Rfl:     lisinopril (PRINIVIL,ZESTRIL) 20 MG tablet, TAKE 2 TABLETS BY MOUTH EVERY DAY, Disp: 180 tablet, Rfl: 3    metoprolol tartrate (LOPRESSOR) 25 MG tablet, TAKE 1 TABLET BY MOUTH TWO TIMES PER DAY, Disp: 180 tablet, Rfl: 1    tadalafil (CIALIS) 5 MG tablet, Daily As Needed., Disp: , Rfl:     zolpidem (AMBIEN) 5 MG tablet, Take 1 tablet by mouth At Night As Needed for Sleep., Disp: 30 tablet, Rfl: 2    apixaban (Eliquis) 5 MG tablet tablet, Take 2 tablets by mouth 2 (Two) Times a Day for 7 days. Then increase to 5mg po bid, Disp: 28 tablet, Rfl: 0     Objective     Lower Extremity Issue       Mr Pfeiffer is a 69 year old male patient with who is here for an acute visit. He has had some right sciatic pain that has improved but has had some tightness in his calf. He noticed that his right calf was bigger than the left and when he measured it was about an inch larger.   He denies redness and warmth. He recently had an echo and a LV thrombus was seen . He was prescribed eliquis but reports the pharmacy has not had it. Cardiac MRI was done that showed no evidence of apical thrombus.   He denies chest pain and shortness of breath    The following data was reviewed by: ZULEIKA Hankins on 10/07/2024:  Common labs          5/31/2024    08:09   Common Labs   Glucose 127    BUN 23    Creatinine 1.24    Sodium 138    Potassium 4.2    Chloride 103    Calcium 9.1    Total Protein 6.2    Albumin 4.0    Total Bilirubin 0.6     Alkaline Phosphatase 99    AST (SGOT) 21    ALT (SGPT) 23    WBC 7.26    Hemoglobin 14.9    Hematocrit 43.8    Platelets 238    Total Cholesterol 161    Triglycerides 134    HDL Cholesterol 45    LDL Cholesterol  92    Hemoglobin A1C 6.60    PSA 0.284      Data reviewed : Cardiology studies MRI cardiac function and Consultant notes cardiology         Review of Systems   Constitutional:  Negative for fatigue and fever.   Respiratory:  Negative for chest tightness and shortness of breath.    Cardiovascular:  Positive for leg swelling. Negative for chest pain.       Physical Exam  Vitals and nursing note reviewed.   Constitutional:       General: He is not in acute distress.  HENT:      Head: Normocephalic.   Cardiovascular:      Rate and Rhythm: Normal rate and regular rhythm.      Heart sounds: Normal heart sounds.      Comments: Right calf 16.5 inches  Left calf 14.5 inches  There is no pitting edema  Right calf is warmer than the left   There is no redness   Pulmonary:      Effort: Pulmonary effort is normal.      Breath sounds: Normal breath sounds.   Musculoskeletal:      Right lower leg: No edema.      Left lower leg: No edema.   Skin:     General: Skin is warm and dry.   Neurological:      Mental Status: He is alert and oriented to person, place, and time.         Assessment    ASSESSMENT    Diagnoses and all orders for this visit:    1. Swelling of right lower extremity (Primary)  -     Duplex Venous Lower Extremity - Right CAR; Future    2. Acute deep vein thrombosis (DVT) of other specified vein of right lower extremity  -     apixaban (Eliquis) 5 MG tablet tablet; Take 2 tablets by mouth 2 (Two) Times a Day for 7 days. Then increase to 5mg po bid  Dispense: 28 tablet; Refill: 0          PLAN  STAT prelim lower extremity doppler was positive for DVT- d/w results with patient will start eliquis 10 mg bid for 7 days then eliquis 5mg po bid   Patient will continue activity as tolerated  Continue wearing  compression stockings   Advised to go to the ER for chest pain/ shortness of breath   Return for as scheduled. Next month or sooner if needed

## 2024-10-09 ENCOUNTER — DOCUMENTATION (OUTPATIENT)
Age: 69
End: 2024-10-09
Payer: COMMERCIAL

## 2024-10-09 NOTE — PROGRESS NOTES
"Dr. Rivera and I called Mr. Pfeiffer about his MRI.     He feels that his PVCs are much less now than when wearing the monitor.     He said, \" I know I had a heart attack and part of my heart is dead, been that way for 25 years.\"     We discussed moving forward with further intervention but he wishes to defer at this time.     Recommend he keep scheduled follow up. Advised to call with any questions or concerns.   "

## 2024-10-24 ENCOUNTER — TELEPHONE (OUTPATIENT)
Dept: INTERNAL MEDICINE | Facility: CLINIC | Age: 69
End: 2024-10-24
Payer: COMMERCIAL

## 2024-10-24 DIAGNOSIS — I51.3 LV (LEFT VENTRICULAR) MURAL THROMBUS: ICD-10-CM

## 2024-10-25 ENCOUNTER — TELEPHONE (OUTPATIENT)
Dept: INTERNAL MEDICINE | Facility: CLINIC | Age: 69
End: 2024-10-25
Payer: COMMERCIAL

## 2024-10-25 DIAGNOSIS — I51.3 LV (LEFT VENTRICULAR) MURAL THROMBUS: ICD-10-CM

## 2024-11-19 DIAGNOSIS — E11.9 TYPE 2 DIABETES MELLITUS WITHOUT COMPLICATION, WITHOUT LONG-TERM CURRENT USE OF INSULIN: Primary | ICD-10-CM

## 2024-11-22 RX ORDER — HYDROCHLOROTHIAZIDE 25 MG/1
25 TABLET ORAL DAILY
Qty: 90 TABLET | Refills: 3 | Status: SHIPPED | OUTPATIENT
Start: 2024-11-22

## 2024-11-22 RX ORDER — ATORVASTATIN CALCIUM 40 MG/1
40 TABLET, FILM COATED ORAL DAILY
Qty: 90 TABLET | Refills: 3 | Status: SHIPPED | OUTPATIENT
Start: 2024-11-22

## 2024-11-28 LAB
ALBUMIN SERPL-MCNC: 3.9 G/DL (ref 3.5–5.2)
ALBUMIN/GLOB SERPL: 1.6 G/DL
ALP SERPL-CCNC: 109 U/L (ref 39–117)
ALT SERPL-CCNC: 19 U/L (ref 1–41)
AST SERPL-CCNC: 20 U/L (ref 1–40)
BILIRUB SERPL-MCNC: 0.6 MG/DL (ref 0–1.2)
BUN SERPL-MCNC: 23 MG/DL (ref 8–23)
BUN/CREAT SERPL: 17.3 (ref 7–25)
CALCIUM SERPL-MCNC: 8.8 MG/DL (ref 8.6–10.5)
CHLORIDE SERPL-SCNC: 100 MMOL/L (ref 98–107)
CO2 SERPL-SCNC: 27.2 MMOL/L (ref 22–29)
CREAT SERPL-MCNC: 1.33 MG/DL (ref 0.76–1.27)
EGFRCR SERPLBLD CKD-EPI 2021: 57.1 ML/MIN/1.73
GLOBULIN SER CALC-MCNC: 2.5 GM/DL
GLUCOSE SERPL-MCNC: 120 MG/DL (ref 65–99)
HBA1C MFR BLD: 6.2 % (ref 4.8–5.6)
MICROALBUMIN UR-MCNC: 5.3 UG/ML
POTASSIUM SERPL-SCNC: 3.9 MMOL/L (ref 3.5–5.2)
PROT SERPL-MCNC: 6.4 G/DL (ref 6–8.5)
SODIUM SERPL-SCNC: 139 MMOL/L (ref 136–145)

## 2024-12-05 ENCOUNTER — OFFICE VISIT (OUTPATIENT)
Dept: INTERNAL MEDICINE | Facility: CLINIC | Age: 69
End: 2024-12-05
Payer: COMMERCIAL

## 2024-12-05 VITALS
DIASTOLIC BLOOD PRESSURE: 60 MMHG | SYSTOLIC BLOOD PRESSURE: 120 MMHG | RESPIRATION RATE: 16 BRPM | BODY MASS INDEX: 30.16 KG/M2 | HEART RATE: 55 BPM | HEIGHT: 68 IN | WEIGHT: 199 LBS | OXYGEN SATURATION: 96 %

## 2024-12-05 DIAGNOSIS — I10 ESSENTIAL HYPERTENSION: Primary | ICD-10-CM

## 2024-12-05 DIAGNOSIS — I82.491 ACUTE DEEP VEIN THROMBOSIS (DVT) OF OTHER SPECIFIED VEIN OF RIGHT LOWER EXTREMITY: ICD-10-CM

## 2024-12-05 DIAGNOSIS — E78.5 DYSLIPIDEMIA: ICD-10-CM

## 2024-12-05 DIAGNOSIS — E11.9 TYPE 2 DIABETES MELLITUS WITHOUT COMPLICATION, WITHOUT LONG-TERM CURRENT USE OF INSULIN: ICD-10-CM

## 2024-12-05 PROBLEM — I82.409 DEEP VENOUS THROMBOSIS: Status: ACTIVE | Noted: 2024-12-05

## 2024-12-05 PROCEDURE — 99214 OFFICE O/P EST MOD 30 MIN: CPT | Performed by: NURSE PRACTITIONER

## 2024-12-05 NOTE — PROGRESS NOTES
Subjective   Janusz Pfeiffer is a 69 y.o. male. Patient is here today for   Chief Complaint   Patient presents with    Diabetes   Answers submitted by the patient for this visit:  Primary Reason for Visit (Submitted on 12/2/2024)  What is the primary reason for your visit?: Extremity Pain  Lower Extremity Injury Questionnaire (Submitted on 12/2/2024)  Chief Complaint: Extremity pain  Injury: No  Pain location: right hand  Pain quality: aching  Pain - numeric: 2/10  Progression since onset: worse  tingling: No  difficulty holding things: Yes  upper extremity swelling: No  redness: No  Additional information: R Thumb: previous repair Game Keeper's Fracture           Vitals:    12/05/24 0826   BP: 120/60   Pulse: 55   Resp: 16   SpO2: 96%     Body mass index is 30.26 kg/m².  The following portions of the patient's history were reviewed and updated as appropriate: allergies, current medications, past family history, past medical history, past social history, past surgical history and problem list.    History of Present Illness  Janusz is here for a follow up for HTN, HLD, and type 2 DM. He has been on eliquis since oct for acute dvt .     He reports a high intake of water and noticeable weight loss, which he attributes to intermittent fasting. He maintains a healthy diet, rich in greens and proteins. He has an upcoming eye exam scheduled for next week.    He is currently on Eliquis for acute dvt. . He mentions a past incident where a clot was suspected in his left ventricle, but a cardiac MRI ruled it out. He expresses a dislike for Eliquis and reports no pain. He regularly wears compression socks and reports no numbness or tingling in his feet. He has not experienced any recent travel or surgeries. He does not endorse any upper respiratory symptoms, except for the usual seasonal ones.    He expresses pain in his  with left thumb, which he believes is due to damage from a previous gamekeeper fracture repair. He  experiences frequent pain and reduced  strength, leading him to suspect arthritis at the surgery site. He is able to bend his thumb but occasionally experiences discomfort at the repair site.    Past Medical History:   Diagnosis Date    Allergic     Benign prostatic hyperplasia     CAD (coronary artery disease)     Deep vein thrombosis 11/2024    R calf    History of transfusion     Hyperlipidemia 2011    Hypertension 2017    Hypocholesteremia     Ischemic heart disease     Keratosis     Kidney stone 2006    9mm stone    Myocardial disease     Myocardial infarction     PAC (premature atrial contraction)     PVC (premature ventricular contraction)     Skin cancer of face       Allergies   Allergen Reactions    Morphine And Codeine Hives     soa      Social History     Socioeconomic History    Marital status:    Tobacco Use    Smoking status: Never    Smokeless tobacco: Never    Tobacco comments:     no caffeine    Vaping Use    Vaping status: Never Used   Substance and Sexual Activity    Alcohol use: Yes     Alcohol/week: 2.0 standard drinks of alcohol     Types: 2 Glasses of wine per week     Comment: social    Drug use: No    Sexual activity: Yes     Partners: Female     Birth control/protection: Post-menopausal        Current Outpatient Medications:     apixaban (ELIQUIS) 5 MG tablet tablet, Take 1 tablet by mouth 2 (Two) Times a Day., Disp: 60 tablet, Rfl: 5    aspirin 81 MG tablet, Take 1 tablet by mouth Daily., Disp: , Rfl:     atorvastatin (LIPITOR) 40 MG tablet, TAKE 1 TABLET BY MOUTH EVERY DAY, Disp: 90 tablet, Rfl: 3    CBD (cannabidiol) oral oil, , Disp: , Rfl:     cetirizine (ZyrTEC Allergy) 10 MG tablet, , Disp: , Rfl:     Cholecalciferol (VITAMIN D) 1000 units tablet, Take 1 tablet by mouth Daily., Disp: , Rfl:     hydroCHLOROthiazide 25 MG tablet, TAKE 1 TABLET BY MOUTH EVERY DAY, Disp: 90 tablet, Rfl: 3    lisinopril (PRINIVIL,ZESTRIL) 20 MG tablet, TAKE 2 TABLETS BY MOUTH EVERY DAY, Disp:  180 tablet, Rfl: 3    metoprolol tartrate (LOPRESSOR) 25 MG tablet, TAKE 1 TABLET BY MOUTH TWO TIMES PER DAY, Disp: 180 tablet, Rfl: 1    tadalafil (CIALIS) 5 MG tablet, Daily As Needed., Disp: , Rfl:     zolpidem (AMBIEN) 5 MG tablet, Take 1 tablet by mouth At Night As Needed for Sleep., Disp: 30 tablet, Rfl: 2   Review of Systems   Constitutional:  Negative for fatigue.   HENT:  Positive for congestion and postnasal drip.    Respiratory: Negative.     Cardiovascular: Negative.    Endocrine: Positive for cold intolerance.   Genitourinary:  Positive for frequency. Negative for decreased urine volume and urgency.   Allergic/Immunologic: Positive for environmental allergies.   Neurological:  Negative for numbness.       Objective   Physical Exam  Vitals and nursing note reviewed.   Constitutional:       General: He is not in acute distress.  HENT:      Head: Normocephalic.   Cardiovascular:      Rate and Rhythm: Normal rate and regular rhythm.      Heart sounds: Normal heart sounds.   Pulmonary:      Effort: Pulmonary effort is normal.      Breath sounds: Normal breath sounds.   Musculoskeletal:      Right lower leg: No edema.      Left lower leg: No edema.   Skin:     General: Skin is warm and dry.   Neurological:      Mental Status: He is alert and oriented to person, place, and time.   Psychiatric:         Mood and Affect: Mood normal.         Orders Only on 11/19/2024   Component Date Value Ref Range Status    Glucose 11/27/2024 120 (H)  65 - 99 mg/dL Final    BUN 11/27/2024 23  8 - 23 mg/dL Final    Creatinine 11/27/2024 1.33 (H)  0.76 - 1.27 mg/dL Final    EGFR Result 11/27/2024 57.1 (L)  >60.0 mL/min/1.73 Final    Comment: GFR Normal >60  Chronic Kidney Disease <60  Kidney Failure <15  The GFR formula is only valid for adults with stable renal  function between ages 18 and 70.      BUN/Creatinine Ratio 11/27/2024 17.3  7.0 - 25.0 Final    Sodium 11/27/2024 139  136 - 145 mmol/L Final    Potassium 11/27/2024 3.9   3.5 - 5.2 mmol/L Final    Chloride 11/27/2024 100  98 - 107 mmol/L Final    Total CO2 11/27/2024 27.2  22.0 - 29.0 mmol/L Final    Calcium 11/27/2024 8.8  8.6 - 10.5 mg/dL Final    Total Protein 11/27/2024 6.4  6.0 - 8.5 g/dL Final    Albumin 11/27/2024 3.9  3.5 - 5.2 g/dL Final    Globulin 11/27/2024 2.5  gm/dL Final    A/G Ratio 11/27/2024 1.6  g/dL Final    Total Bilirubin 11/27/2024 0.6  0.0 - 1.2 mg/dL Final    Alkaline Phosphatase 11/27/2024 109  39 - 117 U/L Final    AST (SGOT) 11/27/2024 20  1 - 40 U/L Final    ALT (SGPT) 11/27/2024 19  1 - 41 U/L Final    Hemoglobin A1C 11/27/2024 6.20 (H)  4.80 - 5.60 % Final    Comment: Hemoglobin A1C Ranges:  Increased Risk for Diabetes  5.7% to 6.4%  Diabetes                     >= 6.5%  Diabetic Goal                < 7.0%      Microalbumin, Urine 11/27/2024 5.3  Not Estab. ug/mL Final       Assessment    Diagnoses and all orders for this visit:    1. Essential hypertension (Primary)    2. Dyslipidemia    3. Acute deep vein thrombosis (DVT) of other specified vein of right lower extremity    4. Type 2 diabetes mellitus without complication, without long-term current use of insulin        Assessment & Plan  1. Deep vein thrombosis.  He has been on Eliquis since October 2023, for acute dvt. We discussed  . A referral to a hematologist was suggested to determine if an extension of the Eliquis treatment beyond 3 months is necessary and for work up for unprovoked dvt but he declines at this time     2. Left thumb pain.  He reports significant pain and decreased  strength in his left thumb, which he attributes to a previous gamekeeper fracture repair. An x-ray of his left thumb was offered, along with a referral to a hand surgeon, but he declined both options. He was informed that hand surgeons can provide temporary relief through injections if the pain becomes unbearable.    4. Elevated creatinine.  There is a slight increase in his creatinine levels. He was advised to  continue drinking plenty of water and to monitor his kidney function.    5. Diabetes Mellitus.  His A1c levels are within the desired range. This is diet controlled . He has diabetic eye exam scheduled in the next few weeks,     6. Hypertension.  His blood pressure is well-controlled. He was advised to continue his current medication regimen.    7. HLD- continue statin     Follow-up          Return in about 6 months (around 6/5/2025) for Annual physical, with labs.    Patient or patient representative verbalized consent for the use of Ambient Listening during the visit with  ZULEIKA Hankins for chart documentation. 12/5/2024  12:45 EST

## 2025-01-29 ENCOUNTER — OFFICE VISIT (OUTPATIENT)
Dept: CARDIOLOGY | Facility: CLINIC | Age: 70
End: 2025-01-29
Payer: COMMERCIAL

## 2025-01-29 VITALS
WEIGHT: 204.2 LBS | OXYGEN SATURATION: 98 % | HEART RATE: 57 BPM | DIASTOLIC BLOOD PRESSURE: 78 MMHG | HEIGHT: 68 IN | BODY MASS INDEX: 30.95 KG/M2 | SYSTOLIC BLOOD PRESSURE: 118 MMHG

## 2025-01-29 DIAGNOSIS — I10 ESSENTIAL HYPERTENSION: ICD-10-CM

## 2025-01-29 DIAGNOSIS — Z86.718 HISTORY OF DVT (DEEP VEIN THROMBOSIS): ICD-10-CM

## 2025-01-29 DIAGNOSIS — Z95.1 S/P CABG X 4: ICD-10-CM

## 2025-01-29 DIAGNOSIS — I49.3 FREQUENT PVCS: ICD-10-CM

## 2025-01-29 DIAGNOSIS — I25.5 ISCHEMIC CARDIOMYOPATHY: ICD-10-CM

## 2025-01-29 DIAGNOSIS — I25.10 CORONARY ARTERIOSCLEROSIS: Primary | ICD-10-CM

## 2025-01-29 PROBLEM — I42.0 DILATED CARDIOMYOPATHY: Status: ACTIVE | Noted: 2025-01-29

## 2025-01-29 PROBLEM — I51.3 LV (LEFT VENTRICULAR) MURAL THROMBUS: Status: ACTIVE | Noted: 2025-01-29

## 2025-01-29 PROCEDURE — 99214 OFFICE O/P EST MOD 30 MIN: CPT | Performed by: NURSE PRACTITIONER

## 2025-01-29 PROCEDURE — 93000 ELECTROCARDIOGRAM COMPLETE: CPT | Performed by: NURSE PRACTITIONER

## 2025-01-29 NOTE — PROGRESS NOTES
Date of Office Visit: 2025  Encounter Provider: ZULEIKA Cotton  Place of Service: [facility] CARDIOLOGY  Patient Name: Janusz Pfeiffer  :1955    Chief Complaint   Patient presents with    Coronary Artery Disease   :     HPI: Janusz Pfeiffer is a 69 y.o. male patient of Dr. Garcia with hypertension, coronary artery disease (history of CABG in 2016), and an ischemic cardiomyopathy.    In 2024, he was evaluated by EP for premature ventricular contractions.  Recent Holter monitor demonstrated a 47% burden.  Reportedly he has a longstanding history of these dating back to .  Dr. Rivera felt with the recent Holter monitor seemed out of character.  The patient reported only mild associated symptoms.  An echocardiogram was ordered demonstrating an EF of 36 to 40% as well as an LV thrombus.  Eliquis was recommended.  However, due to issues with the insurance company, this was never started.  In September, he underwent a cardiac MRI demonstrating no evidence of LV thrombus as well as late uptake of gadolinium in the apex indicating the potential of a scar.  His EF was 36%.  Results were reviewed with the patient, and no changes were recommended.      In October, he was diagnosed with an acute right lower extremity DVT and started on Eliquis.  Of note, this was unprovoked.    He has been feeling well.  He denies any chest pain, shortness of breath, palpitations, edema, dizziness, or syncope.  He denies any bleeding difficulties or melena.  He exercises 7 days a week without limitation.  He is approaching the 3-month shira of Eliquis therapy and is hoping to discontinue it.    Past Medical History:   Diagnosis Date    Allergic     Benign prostatic hyperplasia     CAD (coronary artery disease)     Deep vein thrombosis 2024    R calf    History of transfusion     Hyperlipidemia 2011    Hypertension 2017    Hypocholesteremia     Ischemic heart disease      Keratosis     Kidney stone 2006    9mm stone    Myocardial disease     Myocardial infarction     PAC (premature atrial contraction)     PVC (premature ventricular contraction)     Skin cancer of face        Past Surgical History:   Procedure Laterality Date    ADENOIDECTOMY  1954    CARDIAC CATHETERIZATION      COLONOSCOPY      COLONOSCOPY N/A 3/21/2019    Procedure: COLONOSCOPY TO CECUM;  Surgeon: Carlos Storey MD;  Location[facility] ENDOSCOPY;  Service: General    CORONARY ANGIOPLASTY  08/2000    stent LAD    CORONARY ARTERY BYPASS GRAFT  12/2016    4 vessel    CORONARY STENT PLACEMENT      LAD    EYE SURGERY      FRACTURE SURGERY  1990    RETINAL LASER PROCEDURE Left     SKIN BIOPSY      TONSILLECTOMY         Social History     Socioeconomic History    Marital status:    Tobacco Use    Smoking status: Never     Passive exposure: Never    Smokeless tobacco: Never    Tobacco comments:     no caffeine    Vaping Use    Vaping status: Never Used   Substance and Sexual Activity    Alcohol use: Yes     Alcohol/week: 2.0 standard drinks of alcohol     Types: 2 Glasses of wine per week     Comment: social    Drug use: No    Sexual activity: Yes     Partners: Female     Birth control/protection: Post-menopausal       Family History   Problem Relation Age of Onset    Heart disease Mother     Heart attack Mother     Hypertension Mother     Hyperlipidemia Mother     Cancer Father         Skin, non Hodgkins lymphoma    Hearing loss Father         [employer]    Vision loss Father         Macular Degeneration    Heart disease Paternal Uncle        Review of Systems   Constitutional: Negative.   Cardiovascular: Negative.  Negative for chest pain, dyspnea on exertion, leg swelling, orthopnea, paroxysmal nocturnal dyspnea and syncope.   Respiratory: Negative.     Hematologic/Lymphatic: Negative for bleeding problem.   Musculoskeletal:  Negative for falls.   Gastrointestinal:  Negative for melena.   Neurological:   "Negative for dizziness and light-headedness.       Allergies   Allergen Reactions    Morphine And Codeine Hives     soa         Current Outpatient Medications:     aspirin 81 MG tablet, Take 1 tablet by mouth Daily., Disp: , Rfl:     atorvastatin (LIPITOR) 40 MG tablet, TAKE 1 TABLET BY MOUTH EVERY DAY, Disp: 90 tablet, Rfl: 3    CBD (cannabidiol) oral oil, , Disp: , Rfl:     cetirizine (ZyrTEC Allergy) 10 MG tablet, , Disp: , Rfl:     Cholecalciferol (VITAMIN D) 1000 units tablet, Take 1 tablet by mouth Daily., Disp: , Rfl:     hydroCHLOROthiazide 25 MG tablet, TAKE 1 TABLET BY MOUTH EVERY DAY, Disp: 90 tablet, Rfl: 3    lisinopril (PRINIVIL,ZESTRIL) 20 MG tablet, TAKE 2 TABLETS BY MOUTH EVERY DAY, Disp: 180 tablet, Rfl: 3    metoprolol tartrate (LOPRESSOR) 25 MG tablet, TAKE 1 TABLET BY MOUTH TWO TIMES PER DAY, Disp: 180 tablet, Rfl: 1    tadalafil (CIALIS) 5 MG tablet, Daily As Needed., Disp: , Rfl:     zolpidem (AMBIEN) 5 MG tablet, Take 1 tablet by mouth At Night As Needed for Sleep., Disp: 30 tablet, Rfl: 2      Objective:     Vitals:    01/29/25 1236   BP: 118/78   BP Location: Left arm   Patient Position: Sitting   Cuff Size: Adult   Pulse: 57   SpO2: 98%   Weight: 92.6 kg (204 lb 3.2 oz)   Height: 172.7 cm (68\")     Body mass index is 31.05 kg/m².    PHYSICAL EXAM:    Neck:      Vascular: No JVD.   Pulmonary:      Effort: Pulmonary effort is normal.      Breath sounds: Normal breath sounds.   Cardiovascular:      Normal rate. Occasional ectopic beats. Regular rhythm.      Murmurs: There is no murmur.      No gallop.  No click. No rub.   Pulses:     Intact distal pulses.           ECG 12 Lead    Date/Time: 1/29/2025 12:48 PM  Performed by: Sylvie Naranjo APRN    Authorized by: Sylvie Naranjo APRN  Comparison: compared with previous ECG from 8/8/2024  Similar to previous ECG  Rhythm: sinus rhythm  Ectopy: unifocal PVCs  Rate: normal  BPM: 57            Assessment:       Diagnosis Plan   1. " Coronary arteriosclerosis  ECG 12 Lead      2. S/P CABG x 4        3. Frequent PVCs        4. Ischemic cardiomyopathy        5. History of DVT (deep vein thrombosis)        6. Essential hypertension          Orders Placed This Encounter   Procedures    ECG 12 Lead     This order was created via procedure documentation     Order Specific Question:   Release to patient     Answer:   Routine Release [[id number]]          Plan:       1.  Coronary artery disease.  History of CABG.  He denies any symptoms of angina.  Continue aspirin and atorvastatin.      2.  Frequent PVCs.  He has a longstanding history of this.  He was evaluated by EP in the fall and no changes were recommended.  Continue metoprolol.      3.  Ischemic cardiomyopathy.  EF 36%.  He denies any symptoms of heart failure and appears euvolemic he is on lisinopril.  Discussed with Dr. Mosley who does not feel we need to optimize GDMT any further.      4.  History of DVT.  Discussed with Dr. Mosley.  He has completed 3 months of  anticoaugulation therapy and may discontinue the Eliquis.  He will finish the rest of his current prescription.      5.  Hypertension.  His blood pressure stable.  Continue current regimen      I think he is doing well.  He will follow-up with Dr. Mosley in 1 year.      As always, it has been a pleasure to participate in your patient's care.      Sincerely,         ZULEIKA Lebron

## 2025-02-25 ENCOUNTER — TELEPHONE (OUTPATIENT)
Dept: INTERNAL MEDICINE | Facility: CLINIC | Age: 70
End: 2025-02-25
Payer: COMMERCIAL

## 2025-02-25 DIAGNOSIS — I82.491 ACUTE DEEP VEIN THROMBOSIS (DVT) OF OTHER SPECIFIED VEIN OF RIGHT LOWER EXTREMITY: Primary | ICD-10-CM

## 2025-02-25 NOTE — TELEPHONE ENCOUNTER
See [employer] message. Repeat lower extremity doppler ordered, pt finished eliquis

## 2025-03-11 ENCOUNTER — HOSPITAL ENCOUNTER (OUTPATIENT)
Dept: CARDIOLOGY | Facility: HOSPITAL | Age: 70
Discharge: HOME | End: 2025-03-11
Admitting: NURSE PRACTITIONER
Payer: COMMERCIAL

## 2025-03-11 DIAGNOSIS — I82.491 ACUTE DEEP VEIN THROMBOSIS (DVT) OF OTHER SPECIFIED VEIN OF RIGHT LOWER EXTREMITY: ICD-10-CM

## 2025-03-11 LAB
BH CV LOW VAS RIGHT GASTRONEMIUS VESSEL: 1
BH CV LOW VAS RIGHT POPLITEAL SPONT: 1
BH CV LOWER VASCULAR LEFT COMMON FEMORAL AUGMENT: NORMAL
BH CV LOWER VASCULAR LEFT COMMON FEMORAL COMPETENT: NORMAL
BH CV LOWER VASCULAR LEFT COMMON FEMORAL COMPRESS: NORMAL
BH CV LOWER VASCULAR LEFT COMMON FEMORAL PHASIC: NORMAL
BH CV LOWER VASCULAR LEFT COMMON FEMORAL SPONT: NORMAL
BH CV LOWER VASCULAR RIGHT COMMON FEMORAL AUGMENT: NORMAL
BH CV LOWER VASCULAR RIGHT COMMON FEMORAL COMPETENT: NORMAL
BH CV LOWER VASCULAR RIGHT COMMON FEMORAL COMPRESS: NORMAL
BH CV LOWER VASCULAR RIGHT COMMON FEMORAL PHASIC: NORMAL
BH CV LOWER VASCULAR RIGHT COMMON FEMORAL SPONT: NORMAL
BH CV LOWER VASCULAR RIGHT DISTAL FEMORAL COMPRESS: NORMAL
BH CV LOWER VASCULAR RIGHT GASTRONEMIUS COMPRESS: NORMAL
BH CV LOWER VASCULAR RIGHT GASTRONEMIUS THROMBUS: NORMAL
BH CV LOWER VASCULAR RIGHT GREATER SAPH AK COMPRESS: NORMAL
BH CV LOWER VASCULAR RIGHT GREATER SAPH BK COMPRESS: NORMAL
BH CV LOWER VASCULAR RIGHT LESSER SAPH COMPRESS: NORMAL
BH CV LOWER VASCULAR RIGHT MID FEMORAL AUGMENT: NORMAL
BH CV LOWER VASCULAR RIGHT MID FEMORAL COMPETENT: NORMAL
BH CV LOWER VASCULAR RIGHT MID FEMORAL COMPRESS: NORMAL
BH CV LOWER VASCULAR RIGHT MID FEMORAL PHASIC: NORMAL
BH CV LOWER VASCULAR RIGHT MID FEMORAL SPONT: NORMAL
BH CV LOWER VASCULAR RIGHT PERONEAL COMPRESS: NORMAL
BH CV LOWER VASCULAR RIGHT POPLITEAL AUGMENT: NORMAL
BH CV LOWER VASCULAR RIGHT POPLITEAL COMPETENT: NORMAL
BH CV LOWER VASCULAR RIGHT POPLITEAL COMPRESS: NORMAL
BH CV LOWER VASCULAR RIGHT POPLITEAL PHASIC: NORMAL
BH CV LOWER VASCULAR RIGHT POPLITEAL SPONT: NORMAL
BH CV LOWER VASCULAR RIGHT POPLITEAL THROMBUS: NORMAL
BH CV LOWER VASCULAR RIGHT POSTERIOR TIBIAL COMPRESS: NORMAL
BH CV LOWER VASCULAR RIGHT PROFUNDA FEMORAL COMPRESS: NORMAL
BH CV LOWER VASCULAR RIGHT PROXIMAL FEMORAL COMPRESS: NORMAL
BH CV LOWER VASCULAR RIGHT SAPHENOFEMORAL JUNCTION COMPRESS: NORMAL

## 2025-03-11 PROCEDURE — 93971 EXTREMITY STUDY: CPT

## 2025-03-12 ENCOUNTER — PATIENT MESSAGE (OUTPATIENT)
Dept: INTERNAL MEDICINE | Facility: CLINIC | Age: 70
End: 2025-03-12
Payer: COMMERCIAL

## 2025-03-13 ENCOUNTER — TELEPHONE (OUTPATIENT)
Dept: INTERNAL MEDICINE | Facility: CLINIC | Age: 70
End: 2025-03-13
Payer: COMMERCIAL

## 2025-03-13 DIAGNOSIS — I82.5Y1 CHRONIC DEEP VEIN THROMBOSIS (DVT) OF PROXIMAL VEIN OF RIGHT LOWER EXTREMITY: Primary | ICD-10-CM

## 2025-03-13 NOTE — TELEPHONE ENCOUNTER
Discussed results of venous doppler   Chronic dvt  Will refer to hematology for further evaluation and to determine if pt needs Long term AC

## 2025-03-20 RX ORDER — METOPROLOL TARTRATE 25 MG/1
25 TABLET, FILM COATED ORAL EVERY 12 HOURS SCHEDULED
Qty: 180 TABLET | Refills: 1 | Status: SHIPPED | OUTPATIENT
Start: 2025-03-20

## 2025-03-25 ENCOUNTER — TELEPHONE (OUTPATIENT)
Dept: INTERNAL MEDICINE | Facility: CLINIC | Age: 70
End: 2025-03-25
Payer: COMMERCIAL

## 2025-03-25 NOTE — TELEPHONE ENCOUNTER
Called [facility] at [phone] and spoke to Sarah at the [facility] and she states Dr. Turcios is still reviewing and someone will call the patient in 1 to 2 days to schedule an appointment. Informed patient of all the information.

## 2025-06-03 DIAGNOSIS — E11.9 TYPE 2 DIABETES MELLITUS WITHOUT COMPLICATION, WITHOUT LONG-TERM CURRENT USE OF INSULIN: ICD-10-CM

## 2025-06-03 DIAGNOSIS — Z00.00 ROUTINE HEALTH MAINTENANCE: Primary | ICD-10-CM

## 2025-06-03 DIAGNOSIS — Z12.5 SCREENING PSA (PROSTATE SPECIFIC ANTIGEN): ICD-10-CM

## 2025-06-10 LAB
ALBUMIN SERPL-MCNC: 3.9 G/DL (ref 3.5–5.2)
ALBUMIN/CREAT UR: 4 MG/G CREAT (ref 0–29)
ALBUMIN/GLOB SERPL: 2 G/DL
ALP SERPL-CCNC: 107 U/L (ref 39–117)
ALT SERPL-CCNC: 18 U/L (ref 1–41)
AST SERPL-CCNC: 18 U/L (ref 1–40)
BASOPHILS # BLD AUTO: 0.07 10*3/MM3 (ref 0–0.2)
BASOPHILS NFR BLD AUTO: 0.9 % (ref 0–1.5)
BILIRUB SERPL-MCNC: 0.6 MG/DL (ref 0–1.2)
BUN SERPL-MCNC: 27 MG/DL (ref 8–23)
BUN/CREAT SERPL: 21.1 (ref 7–25)
CALCIUM SERPL-MCNC: 9 MG/DL (ref 8.6–10.5)
CHLORIDE SERPL-SCNC: 102 MMOL/L (ref 98–107)
CHOLEST SERPL-MCNC: 167 MG/DL (ref 0–200)
CO2 SERPL-SCNC: 25.4 MMOL/L (ref 22–29)
CREAT SERPL-MCNC: 1.28 MG/DL (ref 0.76–1.27)
CREAT UR-MCNC: 149.3 MG/DL
EGFRCR SERPLBLD CKD-EPI 2021: 59.5 ML/MIN/1.73
EOSINOPHIL # BLD AUTO: 0.33 10*3/MM3 (ref 0–0.4)
EOSINOPHIL NFR BLD AUTO: 4.5 % (ref 0.3–6.2)
ERYTHROCYTE [DISTWIDTH] IN BLOOD BY AUTOMATED COUNT: 12.3 % (ref 12.3–15.4)
GLOBULIN SER CALC-MCNC: 2 GM/DL
GLUCOSE SERPL-MCNC: 127 MG/DL (ref 65–99)
HBA1C MFR BLD: 6.5 % (ref 4.8–5.6)
HCT VFR BLD AUTO: 43.8 % (ref 37.5–51)
HDLC SERPL-MCNC: 44 MG/DL (ref 40–60)
HGB BLD-MCNC: 14.8 G/DL (ref 13–17.7)
IMM GRANULOCYTES # BLD AUTO: 0.03 10*3/MM3 (ref 0–0.05)
IMM GRANULOCYTES NFR BLD AUTO: 0.4 % (ref 0–0.5)
LDLC SERPL CALC-MCNC: 105 MG/DL (ref 0–100)
LDLC/HDLC SERPL: 2.36 {RATIO}
LYMPHOCYTES # BLD AUTO: 2 10*3/MM3 (ref 0.7–3.1)
LYMPHOCYTES NFR BLD AUTO: 27.1 % (ref 19.6–45.3)
MCH RBC QN AUTO: 32.1 PG (ref 26.6–33)
MCHC RBC AUTO-ENTMCNC: 33.8 G/DL (ref 31.5–35.7)
MCV RBC AUTO: 95 FL (ref 79–97)
MICROALBUMIN UR-MCNC: 6 UG/ML
MONOCYTES # BLD AUTO: 0.79 10*3/MM3 (ref 0.1–0.9)
MONOCYTES NFR BLD AUTO: 10.7 % (ref 5–12)
NEUTROPHILS # BLD AUTO: 4.15 10*3/MM3 (ref 1.7–7)
NEUTROPHILS NFR BLD AUTO: 56.4 % (ref 42.7–76)
NRBC BLD AUTO-RTO: 0 /100 WBC (ref 0–0.2)
PLATELET # BLD AUTO: 265 10*3/MM3 (ref 140–450)
POTASSIUM SERPL-SCNC: 4.1 MMOL/L (ref 3.5–5.2)
PROT SERPL-MCNC: 5.9 G/DL (ref 6–8.5)
PSA SERPL-MCNC: 0.31 NG/ML (ref 0–4)
RBC # BLD AUTO: 4.61 10*6/MM3 (ref 4.14–5.8)
SODIUM SERPL-SCNC: 138 MMOL/L (ref 136–145)
TRIGL SERPL-MCNC: 96 MG/DL (ref 0–150)
TSH SERPL DL<=0.005 MIU/L-ACNC: 0.88 UIU/ML (ref 0.27–4.2)
VLDLC SERPL CALC-MCNC: 18 MG/DL (ref 5–40)
WBC # BLD AUTO: 7.37 10*3/MM3 (ref 3.4–10.8)

## 2025-06-12 NOTE — PROGRESS NOTES
Subjective   Janusz Pfeiffer is a 70 y.o. male. Patient is here today for   Chief Complaint   Patient presents with    Annual Exam          Vitals:    06/13/25 0751   BP: 116/60   Pulse: 63   Resp: 16   SpO2: 98%     Body mass index is 30.87 kg/m².    The following portions of the patient's history were reviewed and updated as appropriate: allergies, current medications, past family history, past medical history, past social history, past surgical history and problem list.    Past Medical History:   Diagnosis Date    Abnormal ECG 08/2000    Allergic     Benign prostatic hyperplasia     CAD (coronary artery disease)     Cataract 12/24    Early stage    Deep vein thrombosis 11/2024    R calf    History of transfusion     Hyperlipidemia 2011    Hypertension 2017    Hypocholesteremia     Ischemic heart disease     Keratosis     Kidney stone 2006    9mm stone    Myocardial disease     Myocardial infarction     PAC (premature atrial contraction)     PVC (premature ventricular contraction)     Seasonal allergies     Skin cancer of face       Allergies   Allergen Reactions    Morphine And Codeine Hives     soa      Social History     Socioeconomic History    Marital status:      Spouse name: Deb   Tobacco Use    Smoking status: Never     Passive exposure: Never    Smokeless tobacco: Never    Tobacco comments:     no caffeine    Vaping Use    Vaping status: Never Used   Substance and Sexual Activity    Alcohol use: Yes     Alcohol/week: 2.0 standard drinks of alcohol     Types: 2 Glasses of wine per week     Comment: social    Drug use: No    Sexual activity: Yes     Partners: Female     Birth control/protection: Post-menopausal        Current Outpatient Medications:     aspirin 81 MG tablet, Take 1 tablet by mouth Daily., Disp: , Rfl:     atorvastatin (LIPITOR) 40 MG tablet, TAKE 1 TABLET BY MOUTH EVERY DAY, Disp: 90 tablet, Rfl: 3    CBD (cannabidiol) oral oil, , Disp: , Rfl:     cetirizine (ZyrTEC Allergy) 10  MG tablet, , Disp: , Rfl:     Cholecalciferol (VITAMIN D) 1000 units tablet, Take 1 tablet by mouth Daily., Disp: , Rfl:     hydroCHLOROthiazide 25 MG tablet, TAKE 1 TABLET BY MOUTH EVERY DAY, Disp: 90 tablet, Rfl: 3    lisinopril (PRINIVIL,ZESTRIL) 20 MG tablet, TAKE 2 TABLETS BY MOUTH EVERY DAY, Disp: 180 tablet, Rfl: 3    metoprolol tartrate (LOPRESSOR) 25 MG tablet, TAKE 1 TABLET BY MOUTH TWO TIMES PER DAY, Disp: 180 tablet, Rfl: 1    tadalafil (CIALIS) 5 MG tablet, Daily As Needed., Disp: , Rfl:     zolpidem (AMBIEN) 5 MG tablet, Take 1 tablet by mouth At Night As Needed for Sleep., Disp: 30 tablet, Rfl: 2          History of Present Illness   Janusz Pfeiffer 70 y.o. male who presents for an Annual physical exam and for a follow up for Type 2 DM and HTN. He is followed by cardiology for CAD.  He has been compliant with his medication . He reports that his diet hasn't been the best lately due to increased stress. He is seeing a therapist and declines need for medication at this time.  He has had chronic left thumb pain . He reports that he has noticed some more weakness in his thumb.   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  Plan to update vaccines if needed today.    Health Habits:  Dental Exam. up to date  Eye Exam. up to date  Exercise: 7 times/week.  Current exercise activities include: cardiovascular workout on exercise equipment, walking, weights       Preventative counseling  - seat belt use for every car ride for patient and all occupants.   Encouraged sunscreen use to reduce risk of skin cancer for any days with sun exposure over 20 minutes.   -Encouraged annual dental and vision exams as part of their overall health.   -Encouraged  exercise  combined with a well-balanced diet.   -Immunizations reviewed and updated in EMR.     The ASCVD Risk score (Arvind MADRIGAL, et al., 2019) failed to  calculate for the following reasons:    Risk score cannot be calculated because patient has a medical history suggesting prior/existing ASCVD    Lab Results (most recent)       Orders Only on 06/03/2025   Component Date Value Ref Range Status    Creatinine, Urine 06/09/2025 149.3  Not Estab. mg/dL Final    Microalbumin, Urine 06/09/2025 6.0  Not Estab. ug/mL Final    Microalbumin/Creatinine Ratio 06/09/2025 4  0 - 29 mg/g creat Final    Comment:                        Normal:                0 -  29                         Moderately increased: 30 - 300                         Severely increased:       >300      Hemoglobin A1C 06/09/2025 6.50 (H)  4.80 - 5.60 % Final    Comment: Hemoglobin A1C Ranges:  Increased Risk for Diabetes  5.7% to 6.4%  Diabetes                     >= 6.5%  Diabetic Goal                < 7.0%      WBC 06/09/2025 7.37  3.40 - 10.80 10*3/mm3 Final    RBC 06/09/2025 4.61  4.14 - 5.80 10*6/mm3 Final    Hemoglobin 06/09/2025 14.8  13.0 - 17.7 g/dL Final    Hematocrit 06/09/2025 43.8  37.5 - 51.0 % Final    MCV 06/09/2025 95.0  79.0 - 97.0 fL Final    MCH 06/09/2025 32.1  26.6 - 33.0 pg Final    MCHC 06/09/2025 33.8  31.5 - 35.7 g/dL Final    RDW 06/09/2025 12.3  12.3 - 15.4 % Final    Platelets 06/09/2025 265  140 - 450 10*3/mm3 Final    Neutrophil Rel % 06/09/2025 56.4  42.7 - 76.0 % Final    Lymphocyte Rel % 06/09/2025 27.1  19.6 - 45.3 % Final    Monocyte Rel % 06/09/2025 10.7  5.0 - 12.0 % Final    Eosinophil Rel % 06/09/2025 4.5  0.3 - 6.2 % Final    Basophil Rel % 06/09/2025 0.9  0.0 - 1.5 % Final    Neutrophils Absolute 06/09/2025 4.15  1.70 - 7.00 10*3/mm3 Final    Lymphocytes Absolute 06/09/2025 2.00  0.70 - 3.10 10*3/mm3 Final    Monocytes Absolute 06/09/2025 0.79  0.10 - 0.90 10*3/mm3 Final    Eosinophils Absolute 06/09/2025 0.33  0.00 - 0.40 10*3/mm3 Final    Basophils Absolute 06/09/2025 0.07  0.00 - 0.20 10*3/mm3 Final    Immature Granulocyte Rel % 06/09/2025 0.4  0.0 - 0.5 % Final     Immature Grans Absolute 06/09/2025 0.03  0.00 - 0.05 10*3/mm3 Final    nRBC 06/09/2025 0.0  0.0 - 0.2 /100 WBC Final    PSA 06/09/2025 0.309  0.000 - 4.000 ng/mL Final    Comment: Testing Method: Roche [employer] Electrochemiluminescence  Immunoassay(ECLIA)  Values obtained with different assay methods or kits cannot  be used interchangeably.      Glucose 06/09/2025 127 (H)  65 - 99 mg/dL Final    BUN 06/09/2025 27.0 (H)  8.0 - 23.0 mg/dL Final    Creatinine 06/09/2025 1.28 (H)  0.76 - 1.27 mg/dL Final    EGFR Result 06/09/2025 59.5 (L)  >60.0 mL/min/1.73 Final    Comment: GFR Categories in Chronic Kidney Disease (CKD)  GFR Category          GFR (mL/min/1.73)    Interpretation  G1                    90 or greater        Normal or high  (1)  G2                    60-89                Mild decrease  (1)  G3a                   45-59                Mild to moderate  decrease  G3b                   30-44                Moderate to  severe decrease  G4                    15-29                Severe decrease  G5                    14 or less           Kidney failure  (1)In the absence of evidence of kidney disease, neither  GFR category G1 or G2 fulfill the criteria for CKD.  eGFR calculation 2021 CKD-EPI creatinine equation, which  does not include race as a factor      BUN/Creatinine Ratio 06/09/2025 21.1  7.0 - 25.0 Final    Sodium 06/09/2025 138  136 - 145 mmol/L Final    Potassium 06/09/2025 4.1  3.5 - 5.2 mmol/L Final    Chloride 06/09/2025 102  98 - 107 mmol/L Final    Total CO2 06/09/2025 25.4  22.0 - 29.0 mmol/L Final    Calcium 06/09/2025 9.0  8.6 - 10.5 mg/dL Final    Total Protein 06/09/2025 5.9 (L)  6.0 - 8.5 g/dL Final    Albumin 06/09/2025 3.9  3.5 - 5.2 g/dL Final    Globulin 06/09/2025 2.0  gm/dL Final    A/G Ratio 06/09/2025 2.0  g/dL Final    Total Bilirubin 06/09/2025 0.6  0.0 - 1.2 mg/dL Final    Alkaline Phosphatase 06/09/2025 107  39 - 117 U/L Final    AST (SGOT) 06/09/2025 18  1 - 40 U/L Final     ALT (SGPT) 06/09/2025 18  1 - 41 U/L Final    Total Cholesterol 06/09/2025 167  0 - 200 mg/dL Final    Comment: Cholesterol Reference Ranges  (U.S. Department of Health and Human Services ATP III  Classifications)  Desirable          <200 mg/dL  Borderline High    200-239 mg/dL  High Risk          >240 mg/dL  Triglyceride Reference Ranges  (U.S. Department of Health and Human Services ATP III  Classifications)  Normal           <150 mg/dL  Borderline High  150-199 mg/dL  High             200-499 mg/dL  Very High        >500 mg/dL  HDL Reference Ranges  (U.S. Department of Health and Human Services ATP III  Classifications)  Low     <40 mg/dl (major risk factor for CHD)  High    >60 mg/dl ('negative' risk factor for CHD)  LDL Reference Ranges  (U.S. Department of Health and Human Services ATP III  Classifications)  Optimal          <100 mg/dL  Near Optimal     100-129 mg/dL  Borderline High  130-159 mg/dL  High             160-189 mg/dL  Very High        >189 mg/dL  LDL is calculated using the NIH LDL-C calculation.      Triglycerides 06/09/2025 96  0 - 150 mg/dL Final    HDL Cholesterol 06/09/2025 44  40 - 60 mg/dL Final    VLDL Cholesterol Efrain 06/09/2025 18  5 - 40 mg/dL Final    LDL Chol Calc (NIH) 06/09/2025 105 (H)  0 - 100 mg/dL Final    LDL/HDL RATIO 06/09/2025 2.36   Final    TSH 06/09/2025 0.880  0.270 - 4.200 uIU/mL Final                 Health Maintenance   Topic Date Due    DIABETIC FOOT EXAM  Never done    COVID-19 Vaccine (4 - 2024-25 season) 12/13/2025 (Originally 9/1/2024)    INFLUENZA VACCINE  07/01/2025    HEMOGLOBIN A1C  12/09/2025    DIABETIC EYE EXAM  12/16/2025    URINE MICROALBUMIN-CREATININE RATIO (uACR)  06/09/2026    ANNUAL PHYSICAL  06/13/2026    COLORECTAL CANCER SCREENING  03/21/2029    TDAP/TD VACCINES (2 - Td or Tdap) 05/26/2032    HEPATITIS C SCREENING  Completed    Pneumococcal Vaccine 50+  Completed    ZOSTER VACCINE  Completed           Review of Systems   Constitutional:   Negative for fatigue.   Respiratory:  Positive for shortness of breath (intermittent).    Cardiovascular:  Positive for chest pain (intermittent,).   Gastrointestinal: Negative.    Genitourinary:  Negative for difficulty urinating and dysuria.   Musculoskeletal:         Left thumb pain    Allergic/Immunologic: Positive for environmental allergies.   Neurological:  Negative for numbness.   Psychiatric/Behavioral:          Increased stress        Objective   Physical Exam  Vitals and nursing note reviewed.   Constitutional:       General: He is not in acute distress.     Appearance: Normal appearance. He is well-developed and well-groomed.   HENT:      Head: Normocephalic.      Right Ear: Tympanic membrane and ear canal normal.      Left Ear: Tympanic membrane and ear canal normal.      Nose: Rhinorrhea present. Rhinorrhea is clear.      Mouth/Throat:      Pharynx: Postnasal drip present.   Eyes:      General: Lids are normal.      Conjunctiva/sclera: Conjunctivae normal.   Neck:      Thyroid: No thyromegaly.   Cardiovascular:      Rate and Rhythm: Normal rate and regular rhythm.      Heart sounds: Normal heart sounds.   Pulmonary:      Effort: Pulmonary effort is normal.      Breath sounds: Normal breath sounds.   Musculoskeletal:      Cervical back: Neck supple.      Right lower leg: No edema.      Left lower leg: No edema.   Skin:     General: Skin is warm and dry.   Neurological:      Mental Status: He is alert and oriented to person, place, and time.         ASSESSMENT       Problems Addressed this Visit       Type 2 diabetes mellitus without complication, without long-term current use of insulin     Other Visit Diagnoses         Annual physical exam    -  Primary      Thumb pain, left        Relevant Orders    XR Hand 3+ View Left          Diagnoses         Codes Comments      Annual physical exam    -  Primary ICD-10-CM: Z00.00  ICD-9-CM: V70.0       Thumb pain, left     ICD-10-CM: M79.645  ICD-9-CM: 729.5        Type 2 diabetes mellitus without complication, without long-term current use of insulin     ICD-10-CM: E11.9  ICD-9-CM: 250.00             PLAN    Age and sex appropriate physical exam performed and documented. Updated past medical, family, social and surgical histories as well as allergies and care team list.   Type 2 DM- A1c is at goal. Continue to work on lifestyle modifications  HLD and CAD- LDL is not at goal, discussed increasing atorvastatin and pt declined the need for.   HTN- well controlled , continue to monitor BP at home, continue current medication. If lower readings will hold hctz   Pt has consult with hematology for DVT  Will get xray for chronic left hand and thumb pain, offered referral to hand sx but he declined at this time     Return in about 6 months (around 12/13/2025) for fasting labs. Lipid panel, A1c, cmp   Patient was given instructions and counseling regarding his  condition for health maintenance advice.  Please see specific information pulled into the AVS if appropriate.

## 2025-06-13 ENCOUNTER — OFFICE VISIT (OUTPATIENT)
Dept: INTERNAL MEDICINE | Facility: CLINIC | Age: 70
End: 2025-06-13
Payer: COMMERCIAL

## 2025-06-13 VITALS
OXYGEN SATURATION: 98 % | HEIGHT: 68 IN | RESPIRATION RATE: 16 BRPM | DIASTOLIC BLOOD PRESSURE: 60 MMHG | HEART RATE: 63 BPM | SYSTOLIC BLOOD PRESSURE: 116 MMHG | BODY MASS INDEX: 30.77 KG/M2 | WEIGHT: 203 LBS

## 2025-06-13 DIAGNOSIS — E11.9 TYPE 2 DIABETES MELLITUS WITHOUT COMPLICATION, WITHOUT LONG-TERM CURRENT USE OF INSULIN: ICD-10-CM

## 2025-06-13 DIAGNOSIS — M79.645 THUMB PAIN, LEFT: ICD-10-CM

## 2025-06-13 DIAGNOSIS — Z00.00 ANNUAL PHYSICAL EXAM: Primary | ICD-10-CM

## 2025-06-20 RX ORDER — LISINOPRIL 20 MG/1
40 TABLET ORAL DAILY
Qty: 180 TABLET | Refills: 3 | Status: SHIPPED | OUTPATIENT
Start: 2025-06-20

## 2025-07-09 ENCOUNTER — CONSULT (OUTPATIENT)
Dept: ONCOLOGY | Facility: CLINIC | Age: 70
End: 2025-07-09
Payer: COMMERCIAL

## 2025-07-09 ENCOUNTER — LAB (OUTPATIENT)
Dept: LAB | Facility: HOSPITAL | Age: 70
End: 2025-07-09
Payer: COMMERCIAL

## 2025-07-09 VITALS
WEIGHT: 209.7 LBS | RESPIRATION RATE: 16 BRPM | TEMPERATURE: 98.1 F | DIASTOLIC BLOOD PRESSURE: 73 MMHG | OXYGEN SATURATION: 98 % | SYSTOLIC BLOOD PRESSURE: 144 MMHG | HEART RATE: 65 BPM | HEIGHT: 68 IN | BODY MASS INDEX: 31.78 KG/M2

## 2025-07-09 DIAGNOSIS — I82.5Y9 CHRONIC DEEP VEIN THROMBOSIS (DVT) OF PROXIMAL VEIN OF LOWER EXTREMITY, UNSPECIFIED LATERALITY: Primary | ICD-10-CM

## 2025-07-09 DIAGNOSIS — I82.401 DEEP VEIN THROMBOSIS (DVT) OF RIGHT LOWER EXTREMITY, UNSPECIFIED CHRONICITY, UNSPECIFIED VEIN: Primary | ICD-10-CM

## 2025-07-09 LAB
BASOPHILS # BLD AUTO: 0.08 10*3/MM3 (ref 0–0.2)
BASOPHILS NFR BLD AUTO: 0.9 % (ref 0–1.5)
DEPRECATED RDW RBC AUTO: 40.4 FL (ref 37–54)
EOSINOPHIL # BLD AUTO: 0.38 10*3/MM3 (ref 0–0.4)
EOSINOPHIL NFR BLD AUTO: 4.3 % (ref 0.3–6.2)
ERYTHROCYTE [DISTWIDTH] IN BLOOD BY AUTOMATED COUNT: 11.9 % (ref 12.3–15.4)
HCT VFR BLD AUTO: 42.7 % (ref 37.5–51)
HGB BLD-MCNC: 14.6 G/DL (ref 13–17.7)
IMM GRANULOCYTES # BLD AUTO: 0.07 10*3/MM3 (ref 0–0.05)
IMM GRANULOCYTES NFR BLD AUTO: 0.8 % (ref 0–0.5)
LYMPHOCYTES # BLD AUTO: 2.49 10*3/MM3 (ref 0.7–3.1)
LYMPHOCYTES NFR BLD AUTO: 28.1 % (ref 19.6–45.3)
MCH RBC QN AUTO: 31.7 PG (ref 26.6–33)
MCHC RBC AUTO-ENTMCNC: 34.2 G/DL (ref 31.5–35.7)
MCV RBC AUTO: 92.8 FL (ref 79–97)
MONOCYTES # BLD AUTO: 0.98 10*3/MM3 (ref 0.1–0.9)
MONOCYTES NFR BLD AUTO: 11.1 % (ref 5–12)
NEUTROPHILS NFR BLD AUTO: 4.86 10*3/MM3 (ref 1.7–7)
NEUTROPHILS NFR BLD AUTO: 54.8 % (ref 42.7–76)
NRBC BLD AUTO-RTO: 0 /100 WBC (ref 0–0.2)
PLATELET # BLD AUTO: 202 10*3/MM3 (ref 140–450)
PMV BLD AUTO: 10.2 FL (ref 6–12)
RBC # BLD AUTO: 4.6 10*6/MM3 (ref 4.14–5.8)
WBC NRBC COR # BLD AUTO: 8.86 10*3/MM3 (ref 3.4–10.8)

## 2025-07-09 PROCEDURE — 85025 COMPLETE CBC W/AUTO DIFF WBC: CPT

## 2025-07-09 PROCEDURE — 36415 COLL VENOUS BLD VENIPUNCTURE: CPT
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
## (undated) DEVICE — CANN NASL CO2 TRULINK W/O2 A/

## (undated) DEVICE — THE TORRENT IRRIGATION SCOPE CONNECTOR IS USED WITH THE TORRENT IRRIGATION TUBING TO PROVIDE IRRIGATION FLUIDS SUCH AS STERILE WATER DURING GASTROINTESTINAL ENDOSCOPIC PROCEDURES WHEN USED IN CONJUNCTION WITH AN IRRIGATION PUMP (OR ELECTROSURGICAL UNIT).: Brand: TORRENT

## (undated) DEVICE — Device: Brand: DEFENDO AIR/WATER/SUCTION AND BIOPSY VALVE

## (undated) DEVICE — TUBING, SUCTION, 1/4" X 10', STRAIGHT: Brand: MEDLINE